# Patient Record
Sex: FEMALE | Race: WHITE | Employment: FULL TIME | URBAN - METROPOLITAN AREA
[De-identification: names, ages, dates, MRNs, and addresses within clinical notes are randomized per-mention and may not be internally consistent; named-entity substitution may affect disease eponyms.]

---

## 2017-01-11 ENCOUNTER — HOSPITAL ENCOUNTER (OUTPATIENT)
Age: 62
Discharge: HOME | End: 2017-01-11
Attending: EMERGENCY MEDICINE
Payer: COMMERCIAL

## 2017-01-11 VITALS
OXYGEN SATURATION: 100 % | DIASTOLIC BLOOD PRESSURE: 89 MMHG | BODY MASS INDEX: 22.32 KG/M2 | WEIGHT: 126 LBS | RESPIRATION RATE: 16 BRPM | TEMPERATURE: 98 F | HEART RATE: 80 BPM | SYSTOLIC BLOOD PRESSURE: 153 MMHG | HEIGHT: 63 IN

## 2017-01-11 DIAGNOSIS — H10.30 ACUTE CONJUNCTIVITIS, UNSPECIFIED ACUTE CONJUNCTIVITIS TYPE, UNSPECIFIED LATERALITY: Primary | ICD-10-CM

## 2017-01-11 PROCEDURE — 99214 OFFICE O/P EST MOD 30 MIN: CPT

## 2017-01-11 PROCEDURE — 99213 OFFICE O/P EST LOW 20 MIN: CPT

## 2017-01-11 RX ORDER — POLYMYXIN B SULFATE AND TRIMETHOPRIM 1; 10000 MG/ML; [USP'U]/ML
1 SOLUTION OPHTHALMIC 4 TIMES DAILY
Qty: 10 ML | Refills: 0 | Status: SHIPPED | OUTPATIENT
Start: 2017-01-11 | End: 2017-01-16

## 2017-01-11 NOTE — ED PROVIDER NOTES
Patient Seen in: [facility] [place]    History   No chief complaint on file. Stated Complaint: pink eye/uri    HPI    70-year-old female presents for evaluation of bilateral eye redness, cough, sore throat.   Patient reports w Constitutional: She is oriented to person, place, and time. She appears well-developed and well-nourished. No distress. HENT:   Head: Normocephalic and atraumatic. Mouth/Throat: Oropharynx is clear and moist. No oropharyngeal exudate.    TMs normal bi Refills: 0

## 2017-03-02 ENCOUNTER — OFFICE VISIT (OUTPATIENT)
Dept: DERMATOLOGY CLINIC | Facility: CLINIC | Age: 62
End: 2017-03-02

## 2017-03-02 DIAGNOSIS — L57.0 ACTINIC KERATOSIS: Primary | ICD-10-CM

## 2017-03-02 DIAGNOSIS — L81.4 SOLAR LENTIGO: ICD-10-CM

## 2017-03-02 PROCEDURE — 99201 OFFICE/OUTPT VISIT,NEW,LEVL I: CPT | Performed by: DERMATOLOGY

## 2017-03-02 PROCEDURE — 17000 DESTRUCT PREMALG LESION: CPT | Performed by: DERMATOLOGY

## 2017-03-02 PROCEDURE — 99212 OFFICE O/P EST SF 10 MIN: CPT | Performed by: DERMATOLOGY

## 2017-03-02 RX ORDER — ERGOCALCIFEROL 1.25 MG/1
CAPSULE ORAL
COMMUNITY
Start: 2017-01-30

## 2017-03-02 RX ORDER — TRETINOIN 0.025 %
1 CREAM (GRAM) TOPICAL NIGHTLY
Qty: 30 G | Refills: 3 | Status: SHIPPED | OUTPATIENT
Start: 2017-03-02 | End: 2018-04-27

## 2017-03-02 RX ORDER — TRIAMCINOLONE ACETONIDE 5 MG/G
CREAM TOPICAL
COMMUNITY
Start: 2017-02-14 | End: 2018-04-27

## 2017-03-02 NOTE — PROGRESS NOTES
HPI:     Chief Complaint     Lesion        HPI     Lesion    Additional comments: New pt presenting with red lesions to L cheek. C/o changes with color and size over past month. Pt denies personal and family hx of skin cancer.         Last edited by Bird Norton No pertinent family history. PHYSICAL EXAM:   Patient is alert and oriented and appears their stated age. They are well-nourished. Exam is remarkable for the following-  1. The area in question is a 9 mm lighter brown macule.   Upper aspect of it with

## 2017-03-07 ENCOUNTER — TELEPHONE (OUTPATIENT)
Dept: DERMATOLOGY CLINIC | Facility: CLINIC | Age: 62
End: 2017-03-07

## 2017-03-07 NOTE — TELEPHONE ENCOUNTER
PA form signed by [facility] and faxed to [facility]. Fax confirmation received. Awaiting response.

## 2017-03-09 NOTE — TELEPHONE ENCOUNTER
Pt contacted, informed that PA for tretinoin was denied by insurance. Explained about the [employer] process, Pt agreed to [employer] from [employer].  Pt given [place] telephone number and informed to call the pharmacy is she does not receive a phone

## 2017-03-09 NOTE — TELEPHONE ENCOUNTER
Pt calling stating that Tazorac is not covered through [facility], cannot afford cost of med. Please advise.

## 2017-03-09 NOTE — TELEPHONE ENCOUNTER
Pt contacted. She states that cost of Tazorac from [employer] was over $150.00. Pt states that tretinoin at [facility] pharmacy was about $72.00 and she will  Rx at [facility]. Pt informed about defferin . 1% cream from OTC, but she is concerned about med not aga

## 2017-03-09 NOTE — TELEPHONE ENCOUNTER
[facility] pharmacy contacted, informed that tretinoin PA was denied

## 2017-03-09 NOTE — TELEPHONE ENCOUNTER
Ok- cost if not covered at [employer] would be $75- would likely last several months- if pt doesn't want to try that, then can use otc differin . 1% cr.  Not as strong, but may still bebeneficial.

## 2017-03-09 NOTE — TELEPHONE ENCOUNTER
Change to Tazorac- generic ok . 05% cr- send to [facility]-30 gm and 3 rf  let pt know this is a retinoid, similar to retin a- used for sun damage, as well as psoriasis amd acne

## 2017-10-07 ENCOUNTER — LAB ENCOUNTER (OUTPATIENT)
Dept: LAB | Facility: HOSPITAL | Age: 62
End: 2017-10-07
Attending: INTERNAL MEDICINE
Payer: COMMERCIAL

## 2017-10-07 DIAGNOSIS — E03.9 MYXEDEMA HEART DISEASE: ICD-10-CM

## 2017-10-07 DIAGNOSIS — I51.9 MYXEDEMA HEART DISEASE: ICD-10-CM

## 2017-10-07 DIAGNOSIS — E78.2 MIXED HYPERLIPIDEMIA: Primary | ICD-10-CM

## 2017-10-07 PROCEDURE — 80061 LIPID PANEL: CPT

## 2017-10-07 PROCEDURE — 36415 COLL VENOUS BLD VENIPUNCTURE: CPT

## 2017-10-07 PROCEDURE — 84443 ASSAY THYROID STIM HORMONE: CPT

## 2017-10-07 PROCEDURE — 84439 ASSAY OF FREE THYROXINE: CPT

## 2018-01-23 ENCOUNTER — OFFICE VISIT (OUTPATIENT)
Dept: INTERNAL MEDICINE CLINIC | Facility: HOSPITAL | Age: 63
End: 2018-01-23
Attending: EMERGENCY MEDICINE

## 2018-01-23 DIAGNOSIS — Z00.00 WELLNESS EXAMINATION: Primary | ICD-10-CM

## 2018-01-23 LAB
FLUAV + FLUBV RNA SPEC NAA+PROBE: NEGATIVE
FLUAV + FLUBV RNA SPEC NAA+PROBE: NEGATIVE
FLUAV + FLUBV RNA SPEC NAA+PROBE: POSITIVE

## 2018-01-23 PROCEDURE — 87631 RESP VIRUS 3-5 TARGETS: CPT

## 2018-04-27 ENCOUNTER — TELEPHONE (OUTPATIENT)
Dept: GASTROENTEROLOGY | Facility: CLINIC | Age: 63
End: 2018-04-27

## 2018-04-27 DIAGNOSIS — Z86.010 PERSONAL HISTORY OF COLONIC POLYPS: Primary | ICD-10-CM

## 2018-04-27 NOTE — TELEPHONE ENCOUNTER
Pt calling for a repeat cln states ;ast cln was about 5 years ago w dr Jackson Randall please call thank you

## 2018-04-27 NOTE — TELEPHONE ENCOUNTER
Last Procedure:  CLN 10/6/2012   Last Diagnosis: Diminutive sigmoid colon polyps.   Recalled for (years): 5 years per [employer]-due 10/6/17  Sedation used previously:  IV  Last Prep Used (if known):  miraLAX  Quality of prep (if known):  \"mostly excellent\"

## 2018-04-30 NOTE — TELEPHONE ENCOUNTER
Scheduled for:  Colonoscopy - [address]7738  Provider Name:  Dr. Robin Chaparro  Date:  6/20/18  Location:  [facility]  Sedation:  IV  Time:  9:45 am (pt is aware to arrive at 8:45 am)  Prep:  Colyte, Prep instructions were given to pt over the phone, pt verbalized understanding.   Filomena Trejo

## 2018-04-30 NOTE — TELEPHONE ENCOUNTER
The patient's chart has been reviewed. Okay to schedule pt for 5 year colonoscopy recall r/t history of colon polyps with Dr. Pete Gant. Advise IV [place] sedation with split dose Colyte or equivalent (eRx) preparation.      May continue all medicat

## 2018-05-04 ENCOUNTER — HOSPITAL ENCOUNTER (OUTPATIENT)
Dept: CT IMAGING | Age: 63
Discharge: HOME | End: 2018-05-04
Attending: INTERNAL MEDICINE

## 2018-05-04 DIAGNOSIS — Z13.6 SCREENING FOR HEART DISEASE: ICD-10-CM

## 2018-05-04 NOTE — PROGRESS NOTES
Pt seen at [facility]:  PRELIMINARY SCORE= 0.0  BP=  Cholestec labs as follows: Patient states that she recently had cholesterol checked through her work.   TC=  HDL=  LDL=  TG=  GLUCOSE=  All results and risk factors discussed with patient; al

## 2018-06-20 ENCOUNTER — SURGERY (OUTPATIENT)
Age: 63
End: 2018-06-20

## 2018-06-20 ENCOUNTER — HOSPITAL ENCOUNTER (OUTPATIENT)
Facility: HOSPITAL | Age: 63
Setting detail: HOSPITAL OUTPATIENT SURGERY
Discharge: HOME | End: 2018-06-20
Attending: INTERNAL MEDICINE | Admitting: PHYSICAL MEDICINE & REHABILITATION
Payer: COMMERCIAL

## 2018-06-20 DIAGNOSIS — Z86.010 PERSONAL HISTORY OF COLONIC POLYPS: ICD-10-CM

## 2018-06-20 PROCEDURE — G0500 MOD SEDAT ENDO SERVICE >5YRS: HCPCS | Performed by: INTERNAL MEDICINE

## 2018-06-20 PROCEDURE — 45385 COLONOSCOPY W/LESION REMOVAL: CPT | Performed by: INTERNAL MEDICINE

## 2018-06-20 PROCEDURE — 0DBL8ZX EXCISION OF TRANSVERSE COLON, VIA NATURAL OR ARTIFICIAL OPENING ENDOSCOPIC, DIAGNOSTIC: ICD-10-PCS | Performed by: INTERNAL MEDICINE

## 2018-06-20 PROCEDURE — 0DBN8ZX EXCISION OF SIGMOID COLON, VIA NATURAL OR ARTIFICIAL OPENING ENDOSCOPIC, DIAGNOSTIC: ICD-10-PCS | Performed by: INTERNAL MEDICINE

## 2018-06-20 RX ORDER — DICYCLOMINE HCL 20 MG
20 TABLET ORAL 4 TIMES DAILY PRN
Qty: 30 TABLET | Refills: 1 | Status: SHIPPED | OUTPATIENT
Start: 2018-06-20

## 2018-06-20 RX ORDER — MIDAZOLAM HYDROCHLORIDE 1 MG/ML
INJECTION INTRAMUSCULAR; INTRAVENOUS
Status: DISCONTINUED | OUTPATIENT
Start: 2018-06-20 | End: 2018-06-20

## 2018-06-20 RX ORDER — DICYCLOMINE HCL 20 MG
20 TABLET ORAL
Status: ON HOLD | COMMUNITY
End: 2018-06-20

## 2018-06-20 RX ORDER — SODIUM CHLORIDE, SODIUM LACTATE, POTASSIUM CHLORIDE, CALCIUM CHLORIDE 600; 310; 30; 20 MG/100ML; MG/100ML; MG/100ML; MG/100ML
INJECTION, SOLUTION INTRAVENOUS CONTINUOUS
Status: DISCONTINUED | OUTPATIENT
Start: 2018-06-20 | End: 2018-06-20

## 2018-06-20 RX ORDER — MIDAZOLAM HYDROCHLORIDE 1 MG/ML
1 INJECTION INTRAMUSCULAR; INTRAVENOUS EVERY 5 MIN PRN
Status: DISCONTINUED | OUTPATIENT
Start: 2018-06-20 | End: 2018-06-20

## 2018-06-20 RX ORDER — SODIUM CHLORIDE 0.9 % (FLUSH) 0.9 %
10 SYRINGE (ML) INJECTION AS NEEDED
Status: DISCONTINUED | OUTPATIENT
Start: 2018-06-20 | End: 2018-06-20

## 2018-06-20 NOTE — OPERATIVE REPORT
[facility] Endoscopy Report      Date of Procedure:  06/20/18      Preoperative Diagnosis:  1. Personal history of adenomatous colon polyps  2.   Colorectal cancer screening      Postoperative Diagnosis:  Small colon polyps      Procedure: sites revealed no evidence of ongoing bleeding. There were no other colonic polyps, definite diverticula, mass lesions, vascular anomalies or signs of inflammation seen. Retroflexion in the right colon and rectum revealed no abnormalities.   The procedure

## 2018-06-20 NOTE — H&P
History & Physical Examination    Patient Name: Nya Ogden  MRN: [id number]  [facility]: [id number]  YOB: 1955    Diagnosis: Personal history of adenomatous colon polyps, colorectal cancer screening        Prescriptions Prior to Admission:

## 2018-06-21 VITALS
HEIGHT: 63 IN | SYSTOLIC BLOOD PRESSURE: 111 MMHG | DIASTOLIC BLOOD PRESSURE: 72 MMHG | OXYGEN SATURATION: 99 % | HEART RATE: 72 BPM | BODY MASS INDEX: 22.15 KG/M2 | RESPIRATION RATE: 19 BRPM | WEIGHT: 125 LBS

## 2018-06-22 ENCOUNTER — TELEPHONE (OUTPATIENT)
Dept: GASTROENTEROLOGY | Facility: CLINIC | Age: 63
End: 2018-06-22

## 2018-06-22 NOTE — TELEPHONE ENCOUNTER
----- Message from Papo Malone MD sent at 6/22/2018 12:29 PM CDT -----  I spoke to Homar Garcia. She is feeling well. Her polyps were hyperplastic. We discussed that she had a subcentimeter adenoma removed from the colon in 2007.   Subsequent colonosco

## 2018-09-29 ENCOUNTER — LAB ENCOUNTER (OUTPATIENT)
Dept: LAB | Facility: HOSPITAL | Age: 63
End: 2018-09-29
Attending: INTERNAL MEDICINE
Payer: COMMERCIAL

## 2018-09-29 DIAGNOSIS — E78.2 MIXED HYPERLIPIDEMIA: Primary | ICD-10-CM

## 2018-09-29 DIAGNOSIS — D64.9 ANEMIA: ICD-10-CM

## 2018-09-29 DIAGNOSIS — E55.9 VITAMIN D DEFICIENCY: ICD-10-CM

## 2018-09-29 DIAGNOSIS — E03.9 HYPOTHYROIDISM: ICD-10-CM

## 2018-09-29 PROCEDURE — 80053 COMPREHEN METABOLIC PANEL: CPT

## 2018-09-29 PROCEDURE — 82306 VITAMIN D 25 HYDROXY: CPT

## 2018-09-29 PROCEDURE — 84439 ASSAY OF FREE THYROXINE: CPT

## 2018-09-29 PROCEDURE — 36415 COLL VENOUS BLD VENIPUNCTURE: CPT

## 2018-09-29 PROCEDURE — 80061 LIPID PANEL: CPT

## 2018-09-29 PROCEDURE — 85025 COMPLETE CBC W/AUTO DIFF WBC: CPT

## 2018-09-29 PROCEDURE — 84443 ASSAY THYROID STIM HORMONE: CPT

## 2019-10-26 ENCOUNTER — LAB ENCOUNTER (OUTPATIENT)
Dept: LAB | Facility: HOSPITAL | Age: 64
End: 2019-10-26
Attending: INTERNAL MEDICINE
Payer: COMMERCIAL

## 2019-10-26 DIAGNOSIS — E55.9 VITAMIN D DEFICIENCY: ICD-10-CM

## 2019-10-26 DIAGNOSIS — D50.8 OTHER IRON DEFICIENCY ANEMIA: ICD-10-CM

## 2019-10-26 DIAGNOSIS — E78.2 MIXED HYPERLIPIDEMIA: Primary | ICD-10-CM

## 2019-10-26 DIAGNOSIS — E03.9 HYPOTHYROIDISM, ADULT: ICD-10-CM

## 2019-10-26 PROCEDURE — 80053 COMPREHEN METABOLIC PANEL: CPT

## 2019-10-26 PROCEDURE — 36415 COLL VENOUS BLD VENIPUNCTURE: CPT

## 2019-10-26 PROCEDURE — 80061 LIPID PANEL: CPT

## 2019-10-26 PROCEDURE — 84443 ASSAY THYROID STIM HORMONE: CPT

## 2019-10-26 PROCEDURE — 85025 COMPLETE CBC W/AUTO DIFF WBC: CPT

## 2019-10-26 PROCEDURE — 82306 VITAMIN D 25 HYDROXY: CPT

## 2019-10-26 PROCEDURE — 84439 ASSAY OF FREE THYROXINE: CPT

## 2019-11-13 ENCOUNTER — HOSPITAL ENCOUNTER (EMERGENCY)
Facility: HOSPITAL | Age: 64
Discharge: HOME | End: 2019-11-13
Attending: EMERGENCY MEDICINE
Payer: COMMERCIAL

## 2019-11-13 ENCOUNTER — TELEPHONE (OUTPATIENT)
Dept: ORTHOPEDICS CLINIC | Facility: CLINIC | Age: 64
End: 2019-11-13

## 2019-11-13 ENCOUNTER — APPOINTMENT (OUTPATIENT)
Dept: GENERAL RADIOLOGY | Facility: HOSPITAL | Age: 64
End: 2019-11-13
Attending: EMERGENCY MEDICINE
Payer: COMMERCIAL

## 2019-11-13 VITALS
HEART RATE: 61 BPM | RESPIRATION RATE: 16 BRPM | DIASTOLIC BLOOD PRESSURE: 74 MMHG | TEMPERATURE: 98 F | OXYGEN SATURATION: 97 % | SYSTOLIC BLOOD PRESSURE: 161 MMHG

## 2019-11-13 DIAGNOSIS — S62.101A CLOSED FRACTURE OF RIGHT WRIST, INITIAL ENCOUNTER: Primary | ICD-10-CM

## 2019-11-13 PROCEDURE — 99284 EMERGENCY DEPT VISIT MOD MDM: CPT

## 2019-11-13 PROCEDURE — 73110 X-RAY EXAM OF WRIST: CPT | Performed by: EMERGENCY MEDICINE

## 2019-11-13 PROCEDURE — 72100 X-RAY EXAM L-S SPINE 2/3 VWS: CPT | Performed by: EMERGENCY MEDICINE

## 2019-11-13 NOTE — ED PROVIDER NOTES
Patient Seen in: [facility] Emergency Department      History   Patient presents with:  Fall (musculoskeletal, neurologic)    Stated Complaint: fall, wrist/back pain    HPI    Patient is a 30-year-old female she was playing tennis.   She was backpe ear normal.   Nose: Nose normal.   Mouth/Throat: Oropharynx is clear and moist.   Eyes: Conjunctivae and EOM are normal. Pupils are equal, round, and reactive to light. Neck: Neck supple.    Back exam there is some minimal tenderness in lower lumbar and s A short arm OCL splint was applied to the right wrist.  After application of the splint I returned and re-examined the patient.   The splint was adequately immobilizing the joint and distal to the splint the patient's circulation and sensation was intac

## 2019-11-13 NOTE — ED INITIAL ASSESSMENT (HPI)
Connor Calloway is here c/o right wrist pain and lower back pain s/p trip and fall backwards while playing tennis. No obvious deformity. Distal CMS intact to MARY. Denies head injury.

## 2019-11-13 NOTE — TELEPHONE ENCOUNTER
JOSSE Gutierrez, this pt was seen in [address] [place] [address] ED today for right wrist fx. A short arm OCL splint was applied to the right wrist, per ED notes. Xrays taken. Should pt be seen today or tomorrow or can this wait until next week on Monday or Tuesday?

## 2019-11-13 NOTE — TELEPHONE ENCOUNTER
It is not an emergency if it is a non-displaced fracture of the wrist. Patient should be seen in subacute setting, namely within 3-5 days of her ED visit. If we have an opening tomorrow you can add on. Otherwise can be seen in the next few days. Thanks.

## 2019-11-14 NOTE — TELEPHONE ENCOUNTER
Spoke to pt and and she states she has an appt with Chante for today at 10:00AM at [facility].

## 2019-12-19 ENCOUNTER — ORDER TRANSCRIPTION (OUTPATIENT)
Dept: PHYSICAL THERAPY | Facility: HOSPITAL | Age: 64
End: 2019-12-19

## 2019-12-19 ENCOUNTER — OFFICE VISIT (OUTPATIENT)
Dept: OCCUPATIONAL MEDICINE | Facility: HOSPITAL | Age: 64
End: 2019-12-19
Attending: ORTHOPAEDIC SURGERY
Payer: COMMERCIAL

## 2019-12-19 DIAGNOSIS — S62.101A RIGHT WRIST FRACTURE: Primary | ICD-10-CM

## 2019-12-19 PROCEDURE — 97166 OT EVAL MOD COMPLEX 45 MIN: CPT | Performed by: OCCUPATIONAL THERAPIST

## 2019-12-19 PROCEDURE — 97110 THERAPEUTIC EXERCISES: CPT | Performed by: OCCUPATIONAL THERAPIST

## 2019-12-19 NOTE — PROGRESS NOTES
OCCUPATIONAL THERAPY UPPER EXTREMITY EVALUATION:   Referring Physician: Dr. Medardo Rodriguez  Date of onset: 11/13/19  Diagnosis: Right distal radius fracture Date of Service: 12/19/19       PATIENT SUMMARY:   Radha Hi is a 71year old y/o female who pre skills,work and leisure skills. She could benefit from continued skilled OT to address the subcomponents of ROM,  strength and motor control to allow her the ability to regain above- noted skills. In agreement with FOTO score and clinical rationale this ev Modalities                    Moist heat  5 min                                       Charges: OT Eval x1, TE     Total Timed Treatment: 15 min     Total Treatment Time: 45 min       PLAN OF CARE:

## 2019-12-20 ENCOUNTER — OFFICE VISIT (OUTPATIENT)
Dept: OCCUPATIONAL MEDICINE | Facility: HOSPITAL | Age: 64
End: 2019-12-20
Attending: ORTHOPAEDIC SURGERY
Payer: COMMERCIAL

## 2019-12-20 PROCEDURE — 97140 MANUAL THERAPY 1/> REGIONS: CPT | Performed by: OCCUPATIONAL THERAPIST

## 2019-12-20 PROCEDURE — 97110 THERAPEUTIC EXERCISES: CPT | Performed by: OCCUPATIONAL THERAPIST

## 2019-12-20 NOTE — PROGRESS NOTES
Dx:  Right distal radius         Authorized # of Visits:  10         Next MD visit: none scheduled  Fall Risk: standard         Precautions: n/a           Medication Changes since last visit?: No  Subjective: Patient reports wrist is feeling OK after las assessed for  and pinch strength when appropriate. Plan: Continue to focus on pain management and promoting AROM for functional grasping.     Charges: MT, TE2       Total Timed Treatment: 40 min  Total Treatment Time: 45 min

## 2019-12-23 ENCOUNTER — OFFICE VISIT (OUTPATIENT)
Dept: OCCUPATIONAL MEDICINE | Facility: HOSPITAL | Age: 64
End: 2019-12-23
Attending: ORTHOPAEDIC SURGERY
Payer: COMMERCIAL

## 2019-12-23 PROCEDURE — 97140 MANUAL THERAPY 1/> REGIONS: CPT | Performed by: OCCUPATIONAL THERAPIST

## 2019-12-23 PROCEDURE — 97110 THERAPEUTIC EXERCISES: CPT | Performed by: OCCUPATIONAL THERAPIST

## 2019-12-23 NOTE — PROGRESS NOTES
Dx:  Right distal radius         Authorized # of Visits:  10         Next MD visit: none scheduled  Fall Risk: standard         Precautions: n/a           Medication Changes since last visit?: No  Subjective:\"I was using my hand a lot yesterday, I had t play.Adbvised patient to continue with AROM exercises and will advance strengthening when appropriate. Goals:     Pt complaints of pain in right wrist will decrease at worst to 1/10.   Pt will be independent and compliant with comprehensive HEP to main

## 2019-12-26 ENCOUNTER — APPOINTMENT (OUTPATIENT)
Dept: OCCUPATIONAL MEDICINE | Facility: HOSPITAL | Age: 64
End: 2019-12-26
Attending: ORTHOPAEDIC SURGERY
Payer: COMMERCIAL

## 2019-12-26 PROCEDURE — 97140 MANUAL THERAPY 1/> REGIONS: CPT | Performed by: OCCUPATIONAL THERAPIST

## 2019-12-26 PROCEDURE — 97110 THERAPEUTIC EXERCISES: CPT | Performed by: OCCUPATIONAL THERAPIST

## 2019-12-26 NOTE — PROGRESS NOTES
Dx:  Right distal radius         Authorized # of Visits:  10         Next MD visit: none scheduled  Fall Risk: standard         Precautions: n/a           Medication Changes since last visit?: No  Subjective: Patient reports less pain in wrist with perfo Modalities                    Moist heat  5 min  5 min  5 min  5 min                                     Assessment: Patient has improved digit CAO by the following: D1 by 17 degrees, D2 by 10 degrees, D4 by 13 degrees, D

## 2020-01-07 ENCOUNTER — OFFICE VISIT (OUTPATIENT)
Dept: OCCUPATIONAL MEDICINE | Facility: HOSPITAL | Age: 65
End: 2020-01-07
Attending: ORTHOPAEDIC SURGERY
Payer: COMMERCIAL

## 2020-01-07 PROCEDURE — 97110 THERAPEUTIC EXERCISES: CPT | Performed by: OCCUPATIONAL THERAPIST

## 2020-01-07 PROCEDURE — 97140 MANUAL THERAPY 1/> REGIONS: CPT | Performed by: OCCUPATIONAL THERAPIST

## 2020-01-07 NOTE — PROGRESS NOTES
Dx:  Right distal radius         Authorized # of Visits:  10         Next MD visit: none scheduled  Fall Risk: standard         Precautions: n/a           Medication Changes since last visit?: No  Subjective: \"I'm not sure how much I should be wearing m Therapeutic Activity                                       Neuromuscular Re-education                                                             Modalities                    Moist heat  5 min  5 min  5 min  5 min  5 min

## 2020-01-10 ENCOUNTER — OFFICE VISIT (OUTPATIENT)
Dept: OCCUPATIONAL MEDICINE | Facility: HOSPITAL | Age: 65
End: 2020-01-10
Attending: ORTHOPAEDIC SURGERY
Payer: COMMERCIAL

## 2020-01-10 PROCEDURE — 97110 THERAPEUTIC EXERCISES: CPT | Performed by: OCCUPATIONAL THERAPIST

## 2020-01-10 PROCEDURE — 97140 MANUAL THERAPY 1/> REGIONS: CPT | Performed by: OCCUPATIONAL THERAPIST

## 2020-01-10 NOTE — PROGRESS NOTES
Dx:  Right distal radius         Authorized # of Visits:  10         Next MD visit: none scheduled  Fall Risk: standard         Precautions: n/a           Medication Changes since last visit?: No  Subjective: Patient reports she has been compliant with w flex/ext stretch 10 sec x 10, 25   Red web wrist flex/ext stretch 10 sec x 10, 25         wrist AROM and tendon glides  x15, 4x/day        HEP wrist PRE with 2# wt x 10, x4/day  Stretches- table top wrist flexion stretch 10 sec x 3       Therapeu

## 2020-01-13 ENCOUNTER — OFFICE VISIT (OUTPATIENT)
Dept: OCCUPATIONAL MEDICINE | Facility: HOSPITAL | Age: 65
End: 2020-01-13
Attending: ORTHOPAEDIC SURGERY
Payer: COMMERCIAL

## 2020-01-13 PROCEDURE — 97110 THERAPEUTIC EXERCISES: CPT | Performed by: OCCUPATIONAL THERAPIST

## 2020-01-13 PROCEDURE — 97140 MANUAL THERAPY 1/> REGIONS: CPT | Performed by: OCCUPATIONAL THERAPIST

## 2020-01-13 NOTE — PROGRESS NOTES
Dx:  Right distal radius         Authorized # of Visits:  10         Next MD visit: none scheduled  Fall Risk: standard         Precautions: n/a           Medication Changes since last visit?: No  Subjective: Patient continues to experience some pain in Pronation/supination with 1 wt x 20  Pronation/supination with 2 wt x 20      Red putty, FDS/FDP, , twist, two point pinch for pegs    10 min  10 min  green putty 10 min  green putty 10 min  green putty 10 min, also extension rings  green putty 10 min, AROM for functional grasping.     Charges: MT, TE2       Total Timed Treatment: 40 min  Total Treatment Time: 45 min

## 2020-01-16 ENCOUNTER — OFFICE VISIT (OUTPATIENT)
Dept: OCCUPATIONAL MEDICINE | Facility: HOSPITAL | Age: 65
End: 2020-01-16
Attending: ORTHOPAEDIC SURGERY
Payer: COMMERCIAL

## 2020-01-16 PROCEDURE — 97110 THERAPEUTIC EXERCISES: CPT | Performed by: OCCUPATIONAL THERAPIST

## 2020-01-16 PROCEDURE — 97140 MANUAL THERAPY 1/> REGIONS: CPT | Performed by: OCCUPATIONAL THERAPIST

## 2020-01-16 NOTE — PROGRESS NOTES
Dx:  Right distal radius         Authorized # of Visits:  10         Next MD visit: none scheduled  Fall Risk: standard         Precautions: n/a           Medication Changes since last visit?: No  Subjective:  \"The pain has been a lot less, I was able to x21  gyrostick DTM for 2 min    Pronation/supination with 1 wt x 20  Pronation/supination with 1 wt x 20  Pronation/supination with 2 wt x 20  Pronation/supination with 2 wt x 20    Red putty, FDS/FDP, , twist, two point pinch for pegs    10 min  10 will demonstrate increase right  strength to at least 30 lbs for ease in carrying bags of groceries. Plan: Continue to focus on pain management and promoting AROM for functional grasping. Reevaluate next session.     Charges: MT, TE2       Total Time

## 2020-01-21 ENCOUNTER — OFFICE VISIT (OUTPATIENT)
Dept: OCCUPATIONAL MEDICINE | Facility: HOSPITAL | Age: 65
End: 2020-01-21
Attending: ORTHOPAEDIC SURGERY
Payer: COMMERCIAL

## 2020-01-21 PROCEDURE — 97110 THERAPEUTIC EXERCISES: CPT | Performed by: OCCUPATIONAL THERAPIST

## 2020-01-21 PROCEDURE — 97140 MANUAL THERAPY 1/> REGIONS: CPT | Performed by: OCCUPATIONAL THERAPIST

## 2020-01-21 NOTE — PROGRESS NOTES
Dx:  Right distal radius         Authorized # of Visits:  10         Next MD visit: none scheduled  Fall Risk: standard         Precautions: n/a           Medication Changes since last visit?: No  Subjective:  \"The pain has been a lot less, I was able to x7  x7  x7           DTM throwing darts     x21  gyrostick DTM for 2 min    Pronation/supination with 1 wt x 20  Pronation/supination with 1 wt x 20  Pronation/supination with 2 wt x 20  Pronation/supination with 2 wt x 20 Pronation/supination with 2 wt x Total Timed Treatment: 40 min  Total Treatment Time: 45 min               OT  Progress Summary      Pt has attended 9, cancelled 0, and no shown 0 visits in Occupational Therapy.      Subjective: Patient reports pain free wrist with daily activities with e to at least 60 degrees and extension to 55 degrees for ease in playing tennis. Carlos A Gutierrez .(Achieved)  Patient will demonstrate increase in digit D2- D5 CAO to at least 230 degrees for ease in opening jars. .. Carlos A Gutierrez (Achieved)  Patient will be assessed for  and pinch st

## 2020-01-23 ENCOUNTER — TELEPHONE (OUTPATIENT)
Dept: ORTHOPEDICS CLINIC | Facility: CLINIC | Age: 65
End: 2020-01-23

## 2020-01-23 NOTE — TELEPHONE ENCOUNTER
Pt. requesting to get a note for work stating that she is off of light duty. Please fax note to [facility] - fax # [phone]. Please call the pt to confirm that the fax was sent.

## 2020-01-24 ENCOUNTER — OFFICE VISIT (OUTPATIENT)
Dept: OCCUPATIONAL MEDICINE | Facility: HOSPITAL | Age: 65
End: 2020-01-24
Attending: ORTHOPAEDIC SURGERY
Payer: COMMERCIAL

## 2020-01-24 PROCEDURE — 97110 THERAPEUTIC EXERCISES: CPT | Performed by: OCCUPATIONAL THERAPIST

## 2020-01-24 PROCEDURE — 97140 MANUAL THERAPY 1/> REGIONS: CPT | Performed by: OCCUPATIONAL THERAPIST

## 2020-01-24 NOTE — PROGRESS NOTES
Dx:     Right distal radius         Authorized # of Visits:  15       Next MD visit: none scheduled  Fall Risk: standard         Precautions: n/a           Medication Changes since last visit?: No  Subjective: \"I went to see the doctor and my wrist is h in OT... .(Ongoing)  Patient will demonstrate increase in right wrist flexion to at least 60 degrees and extension to 55 degrees for ease in playing tennis. Mily Beckwith .(Achieved)  Patient will demonstrate increase in digit D2- D5 CAO to at least 230 degrees for ease

## 2020-01-27 ENCOUNTER — OFFICE VISIT (OUTPATIENT)
Dept: OCCUPATIONAL MEDICINE | Facility: HOSPITAL | Age: 65
End: 2020-01-27
Attending: ORTHOPAEDIC SURGERY
Payer: COMMERCIAL

## 2020-01-27 PROCEDURE — 97140 MANUAL THERAPY 1/> REGIONS: CPT | Performed by: OCCUPATIONAL THERAPIST

## 2020-01-27 PROCEDURE — 97110 THERAPEUTIC EXERCISES: CPT | Performed by: OCCUPATIONAL THERAPIST

## 2020-01-27 NOTE — PROGRESS NOTES
Dx:     Right distal radius         Authorized # of Visits:  15       Next MD visit: none scheduled  Fall Risk: standard         Precautions: n/a           Medication Changes since last visit?: No  Subjective: \"I went back to work last week and noticed toward)  Pt will be independent and compliant with comprehensive HEP to maintain progress achieved in OT... .(Ongoing)  Patient will demonstrate increase in right wrist flexion to at least 60 degrees and extension to 55 degrees for ease in playing tennis. Oneal Claude Oneal Claude

## 2020-01-28 ENCOUNTER — APPOINTMENT (OUTPATIENT)
Dept: OCCUPATIONAL MEDICINE | Facility: HOSPITAL | Age: 65
End: 2020-01-28
Attending: ORTHOPAEDIC SURGERY
Payer: COMMERCIAL

## 2020-01-30 ENCOUNTER — OFFICE VISIT (OUTPATIENT)
Dept: OCCUPATIONAL MEDICINE | Facility: HOSPITAL | Age: 65
End: 2020-01-30
Attending: ORTHOPAEDIC SURGERY
Payer: COMMERCIAL

## 2020-01-30 PROCEDURE — 97110 THERAPEUTIC EXERCISES: CPT | Performed by: OCCUPATIONAL THERAPIST

## 2020-01-30 PROCEDURE — 97140 MANUAL THERAPY 1/> REGIONS: CPT | Performed by: OCCUPATIONAL THERAPIST

## 2020-01-30 NOTE — PROGRESS NOTES
Dx:     Right distal radius         Authorized # of Visits:  15       Next MD visit: none scheduled  Fall Risk: standard         Precautions: n/a           Medication Changes since last visit?: No  Subjective: \" I was having difficulty with putting on a Modalities                    moist heat  5 min  5 min  5 min                                     Assessment: Patient continues to report weakness in gripping and pulling,Patient remains appropriately challenged with resistance in his program

## 2020-02-04 ENCOUNTER — OFFICE VISIT (OUTPATIENT)
Dept: OCCUPATIONAL MEDICINE | Facility: HOSPITAL | Age: 65
End: 2020-02-04
Attending: ORTHOPAEDIC SURGERY
Payer: COMMERCIAL

## 2020-02-04 PROCEDURE — 97110 THERAPEUTIC EXERCISES: CPT | Performed by: OCCUPATIONAL THERAPIST

## 2020-02-04 PROCEDURE — 97140 MANUAL THERAPY 1/> REGIONS: CPT | Performed by: OCCUPATIONAL THERAPIST

## 2020-02-04 NOTE — PROGRESS NOTES
Dx:     Right distal radius         Authorized # of Visits:  15       Next MD visit: none scheduled  Fall Risk: standard         Precautions: n/a           Medication Changes since last visit?: No  Subjective: \"I tried to hit the ball with tennis racket wt , 5 sec x 10  gripping exercise,  and hold 4# wt , 5 sec x 10  gripping exercise,  and hold 4# wt , 5 sec x 10           Therapeutic Activity                                       Neuromuscular Re-education

## 2020-02-06 ENCOUNTER — APPOINTMENT (OUTPATIENT)
Dept: OCCUPATIONAL MEDICINE | Facility: HOSPITAL | Age: 65
End: 2020-02-06
Attending: ORTHOPAEDIC SURGERY
Payer: COMMERCIAL

## 2020-02-06 PROCEDURE — 97110 THERAPEUTIC EXERCISES: CPT | Performed by: OCCUPATIONAL THERAPIST

## 2020-02-06 NOTE — PROGRESS NOTES
Dx:     Right distal radius         Authorized # of Visits:  15       Next MD visit: none scheduled  Fall Risk: standard         Precautions: n/a           Medication Changes since last visit?: No  Subjective: \"My grandson jumped on me and stretched my at 90 degrees and extended   body blade 2  min each with elbow at 90 degrees and extended   Review conditioning for return to tennis              gripping exercise,  and hold 4# wt , 5 sec x 10  gripping exercise,  and hold 4# wt , 5 sec x 10   49 lbs  Right key pinch 13 lbs   Left key pinch 15 lbs  Right three point pinch 15 lbs    Left three point pinch 16 lbs      Goals:   Pt complaints of pain in right wrist will decrease at worst to 1/10. Dante Carr .(Achieved)  Pt will be independent and compliant wit

## 2020-04-20 ENCOUNTER — LAB ENCOUNTER (OUTPATIENT)
Dept: LAB | Age: 65
End: 2020-04-20
Attending: INTERNAL MEDICINE
Payer: COMMERCIAL

## 2020-04-20 DIAGNOSIS — E03.9 HYPOTHYROIDISM: Primary | ICD-10-CM

## 2020-04-20 PROCEDURE — 84439 ASSAY OF FREE THYROXINE: CPT

## 2020-04-20 PROCEDURE — 84443 ASSAY THYROID STIM HORMONE: CPT

## 2020-04-20 PROCEDURE — 36415 COLL VENOUS BLD VENIPUNCTURE: CPT

## 2020-06-10 ENCOUNTER — PATIENT MESSAGE (OUTPATIENT)
Dept: GASTROENTEROLOGY | Facility: CLINIC | Age: 65
End: 2020-06-10

## 2020-06-10 ENCOUNTER — TELEPHONE (OUTPATIENT)
Dept: GASTROENTEROLOGY | Facility: CLINIC | Age: 65
End: 2020-06-10

## 2020-06-10 NOTE — TELEPHONE ENCOUNTER
Forwarded to Dr George Hogue. Thanks.          Roby Vasquez, RN routed conversation to Carley Murdock MD 6 hours ago (11:19 AM)      Roby Vasquez RN 6 hours ago (11:19 AM)         Patient states has been experiencing loose stool (once daily) x 1 w

## 2020-06-10 NOTE — TELEPHONE ENCOUNTER
Please see my chart message sent to the patient: [facility] Neena Dubin,    I do not think that the change is serious. Any other medication additions or changes other than the metoprolol such as antibiotics in the past several weeks? Any dietary changes?     I w

## 2020-06-10 NOTE — TELEPHONE ENCOUNTER
Patient states has been experiencing loose stool (once daily) x 1 week. Denies abdominal pain,nausea,vomiting,blood in stool or fever. Patient has hx of IBS with constipation. Not taking anything OTC for the loose stool. Patient asking if she should be concerned. Please advise. Thank you.

## 2020-06-29 DIAGNOSIS — Z78.9 PARTICIPANT IN HEALTH AND WELLNESS PLAN: Primary | ICD-10-CM

## 2020-07-02 ENCOUNTER — NURSE ONLY (OUTPATIENT)
Dept: LAB | Age: 65
End: 2020-07-02
Attending: PREVENTIVE MEDICINE

## 2020-07-02 DIAGNOSIS — Z78.9 PARTICIPANT IN HEALTH AND WELLNESS PLAN: ICD-10-CM

## 2020-07-02 PROCEDURE — 86769 SARS-COV-2 COVID-19 ANTIBODY: CPT

## 2020-07-03 LAB — SARS-COV-2 IGG SERPLBLD QL IA.RAPID: NEGATIVE

## 2020-12-09 ENCOUNTER — TELEPHONE (OUTPATIENT)
Dept: INTERNAL MEDICINE CLINIC | Facility: HOSPITAL | Age: 65
End: 2020-12-09

## 2020-12-09 ENCOUNTER — NURSE ONLY (OUTPATIENT)
Dept: LAB | Facility: HOSPITAL | Age: 65
End: 2020-12-09
Attending: PREVENTIVE MEDICINE

## 2020-12-09 DIAGNOSIS — Z20.822 SUSPECTED COVID-19 VIRUS INFECTION: ICD-10-CM

## 2020-12-09 DIAGNOSIS — Z20.822 SUSPECTED COVID-19 VIRUS INFECTION: Primary | ICD-10-CM

## 2020-12-09 PROCEDURE — 87426 SARSCOV CORONAVIRUS AG IA: CPT

## 2020-12-09 NOTE — TELEPHONE ENCOUNTER
Department:                             [] [facility]  []PARRISH   [x] [address] [facility]    Dept Manager/Supervisor/team or clinical lead: Shante Gates    Position:  [] MD     [x] RN     [] Respiratory Therapist     [] PCT     [] Other       SYMPTOMS:  [] asymptomatic    • Fev If yes, with whom? Does not recall who she ate luch with on Monday: states they are spaced at least 6 feet apart. Do you have any family members sick at home?      [] Yes    [x] No   If yes, explain:     NOTES: (narrative documentation)          PLAN:

## 2020-12-09 NOTE — TELEPHONE ENCOUNTER
----- Message from Amina Sharif MD sent at 12/9/2020  1:31 PM CST -----  Covid - not detected - results to covid center for resulting and employee follow up.

## 2020-12-09 NOTE — TELEPHONE ENCOUNTER
Results and RTW guidelines:    COVID RESULT GIVEN:      Test type:    [x] Rapid         []       [x] NEGATIVE     Ordered  retest?  [x]Yes   [] No (skip to RTW)       Date ordered: 12/9           Dated to be taken:  12/11    If Yes, PLACE ORD

## 2020-12-11 ENCOUNTER — LAB ENCOUNTER (OUTPATIENT)
Dept: LAB | Facility: HOSPITAL | Age: 65
End: 2020-12-11
Attending: NURSE PRACTITIONER

## 2020-12-11 DIAGNOSIS — Z20.822 SUSPECTED COVID-19 VIRUS INFECTION: ICD-10-CM

## 2020-12-12 NOTE — TELEPHONE ENCOUNTER
Results and RTW guidelines:    COVID RESULT GIVEN:      Test type:    [] Rapid         [x]       [x] NEGATIVE     Ordered  retest?  []Yes   [x] No (skip to RTW)         [] Positive   - Monitor sx and temperature    - Employee should quarantin

## 2020-12-23 ENCOUNTER — IMMUNIZATION (OUTPATIENT)
Dept: LAB | Facility: HOSPITAL | Age: 65
End: 2020-12-23
Attending: PREVENTIVE MEDICINE

## 2020-12-23 DIAGNOSIS — Z23 NEED FOR VACCINATION: ICD-10-CM

## 2020-12-23 PROCEDURE — 0001A SARSCOV2 VAC 30MCG/0.3ML IM: CPT

## 2021-01-13 ENCOUNTER — IMMUNIZATION (OUTPATIENT)
Dept: LAB | Facility: HOSPITAL | Age: 66
End: 2021-01-13
Attending: PREVENTIVE MEDICINE

## 2021-01-13 DIAGNOSIS — Z23 NEED FOR VACCINATION: Primary | ICD-10-CM

## 2021-01-13 PROCEDURE — 0002A SARSCOV2 VAC 30MCG/0.3ML IM: CPT

## 2021-04-13 ENCOUNTER — ORDER TRANSCRIPTION (OUTPATIENT)
Dept: PHYSICAL THERAPY | Age: 66
End: 2021-04-13

## 2021-04-13 DIAGNOSIS — M75.41 IMPINGEMENT SYNDROME OF RIGHT SHOULDER: Primary | ICD-10-CM

## 2021-04-15 ENCOUNTER — OFFICE VISIT (OUTPATIENT)
Dept: PHYSICAL THERAPY | Facility: HOSPITAL | Age: 66
End: 2021-04-15
Attending: ORTHOPAEDIC SURGERY
Payer: COMMERCIAL

## 2021-04-15 DIAGNOSIS — M75.41 IMPINGEMENT SYNDROME OF RIGHT SHOULDER: ICD-10-CM

## 2021-04-15 PROCEDURE — 97110 THERAPEUTIC EXERCISES: CPT

## 2021-04-15 PROCEDURE — 97161 PT EVAL LOW COMPLEX 20 MIN: CPT

## 2021-04-15 NOTE — PROGRESS NOTES
UPPER EXTREMITY EVALUATION:   Referring Physician: Dr. Gab Woodward  Diagnosis:   Impingement syndrome of left shoulder   Date of Onset: July, 2020    [place] [employer] [place]  Precautions: none significant:  Initial FOTO: 70.1/100  Visit: #8: FOTO:  D reaching of Left shoulder. Current functional limitations include : soreness  with grooming, reaching ( before injection), putting restrictive clothing on,   Fastening bra behind back.    Ernestina Davison describes prior level of function : was without limitati protracted scapulae and mild fwd head posture and left anterior humeral head alignment.    Cervical Screen:  Decreased right rotation, decreased lateral flexion bilatera  Palpation: elevated tenderness at left inferior angle, increased tension at LS and UT ABD to reach and fasten seatbelt   · Pt will increase shoulder AROM IR to 70 deg to be able to reach in back pocket, tuck in shirt, and turn steering wheel without pain  · Pt will improve shoulder strength throughout to 4+/5 to improve function with ADLs i

## 2021-04-20 ENCOUNTER — OFFICE VISIT (OUTPATIENT)
Dept: PHYSICAL THERAPY | Facility: HOSPITAL | Age: 66
End: 2021-04-20
Attending: ORTHOPAEDIC SURGERY
Payer: COMMERCIAL

## 2021-04-20 PROCEDURE — 97110 THERAPEUTIC EXERCISES: CPT

## 2021-04-20 PROCEDURE — 97140 MANUAL THERAPY 1/> REGIONS: CPT

## 2021-04-20 NOTE — PROGRESS NOTES
Dx: Impingement syndrome of left shoulder   Date of Onset: July, 2020   Insurance (Authorized # of Visits):  [employer] [place]   (8-12 visits per plan)        Authorizing Physician: Dr. Li ref.  provider found    Next MD visit: none scheduled  Fall Risk: wilian with comprehensive HEP to maintain progress achieved in PT         Plan: Continued to left shoulder gentle mob, stretching and stabilization exercises to improved functional mobility.    Visit #2/8-12 per plan   Date: 4/20/21 Visit #3/812 per plan  Date:  V

## 2021-04-22 ENCOUNTER — OFFICE VISIT (OUTPATIENT)
Dept: PHYSICAL THERAPY | Facility: HOSPITAL | Age: 66
End: 2021-04-22
Attending: ORTHOPAEDIC SURGERY
Payer: COMMERCIAL

## 2021-04-22 PROCEDURE — 97110 THERAPEUTIC EXERCISES: CPT

## 2021-04-22 PROCEDURE — 97140 MANUAL THERAPY 1/> REGIONS: CPT

## 2021-04-22 NOTE — PROGRESS NOTES
Dx: Impingement syndrome of left shoulder   Date of Onset: July, 2020   Insurance (Authorized # of Visits):  [employer] [place]   (8-12 visits per plan)        Authorizing Physician: Dr. Li ref.  provider found    Next MD visit: none scheduled  Fall Risk: wilian work tasks by 12-16 wks with good tolerance  · Pt will be independent and compliant with comprehensive HEP to maintain progress achieved in PT         Plan: Continued to left shoulder gentle mob, stretching and stabilization exercises to improved functiona

## 2021-05-05 ENCOUNTER — OFFICE VISIT (OUTPATIENT)
Dept: PHYSICAL THERAPY | Facility: HOSPITAL | Age: 66
End: 2021-05-05
Attending: ORTHOPAEDIC SURGERY
Payer: COMMERCIAL

## 2021-05-05 PROCEDURE — 97110 THERAPEUTIC EXERCISES: CPT

## 2021-05-05 PROCEDURE — 97140 MANUAL THERAPY 1/> REGIONS: CPT

## 2021-05-05 NOTE — PROGRESS NOTES
Dx: Impingement syndrome of left shoulder   Date of Onset: July, 2020   Insurance (Authorized # of Visits):  [employer] [place]   (8-12 visits per plan)        Authorizing Physician: Dr. Li ref.  provider found    Next MD visit: none scheduled  Fall Risk: wilian pocket, tuck in shirt, and turn steering wheel without pain  PROGRESSING  · Pt will improve shoulder strength throughout to 4+/5 to improve function with ADLs including lifting groceries opening and closing doors   · Pt will demonstrate increased mid/low t abd, ER/IR Manual Therapy:   -Supine Left shoulder   gr I II post glides, inf glides  -PROM Flex, abd, ER/IR Manual therapy:  -STM to posterior cuff, pect major insertion  -scapular distraction mobilizations right S/L to left scap  -scap mobilizations all

## 2021-05-13 ENCOUNTER — TELEPHONE (OUTPATIENT)
Dept: PHYSICAL THERAPY | Facility: HOSPITAL | Age: 66
End: 2021-05-13

## 2021-05-17 ENCOUNTER — APPOINTMENT (OUTPATIENT)
Dept: PHYSICAL THERAPY | Facility: HOSPITAL | Age: 66
End: 2021-05-17
Attending: INTERNAL MEDICINE
Payer: COMMERCIAL

## 2021-05-23 ENCOUNTER — LAB ENCOUNTER (OUTPATIENT)
Dept: LAB | Facility: HOSPITAL | Age: 66
End: 2021-05-23
Attending: INTERNAL MEDICINE
Payer: COMMERCIAL

## 2021-05-23 DIAGNOSIS — I51.9 MYXEDEMA HEART DISEASE: ICD-10-CM

## 2021-05-23 DIAGNOSIS — D64.9 ANEMIA: ICD-10-CM

## 2021-05-23 DIAGNOSIS — E78.2 MIXED HYPERLIPIDEMIA: Primary | ICD-10-CM

## 2021-05-23 DIAGNOSIS — E03.9 MYXEDEMA HEART DISEASE: ICD-10-CM

## 2021-05-23 DIAGNOSIS — E55.9 VITAMIN D DEFICIENCY: ICD-10-CM

## 2021-05-23 PROCEDURE — 84439 ASSAY OF FREE THYROXINE: CPT

## 2021-05-23 PROCEDURE — 80053 COMPREHEN METABOLIC PANEL: CPT

## 2021-05-23 PROCEDURE — 85025 COMPLETE CBC W/AUTO DIFF WBC: CPT

## 2021-05-23 PROCEDURE — 36415 COLL VENOUS BLD VENIPUNCTURE: CPT

## 2021-05-23 PROCEDURE — 84443 ASSAY THYROID STIM HORMONE: CPT

## 2021-05-23 PROCEDURE — 82306 VITAMIN D 25 HYDROXY: CPT

## 2021-05-23 PROCEDURE — 80061 LIPID PANEL: CPT

## 2021-12-09 RX ORDER — DICYCLOMINE HCL 20 MG
20 TABLET ORAL 4 TIMES DAILY PRN
Qty: 30 TABLET | Refills: 1 | Status: CANCELLED | OUTPATIENT
Start: 2021-12-09

## 2021-12-13 NOTE — TELEPHONE ENCOUNTER
Medication last prescribed 06/20/2018 which is > 3 years. Needs office visit if medication is needed.

## 2022-04-19 ENCOUNTER — HOSPITAL ENCOUNTER (OUTPATIENT)
Age: 67
Discharge: HOME | End: 2022-04-19
Payer: MEDICARE

## 2022-04-19 VITALS
OXYGEN SATURATION: 98 % | HEART RATE: 89 BPM | WEIGHT: 126 LBS | SYSTOLIC BLOOD PRESSURE: 120 MMHG | RESPIRATION RATE: 18 BRPM | HEIGHT: 63 IN | DIASTOLIC BLOOD PRESSURE: 78 MMHG | BODY MASS INDEX: 22.32 KG/M2 | TEMPERATURE: 98 F

## 2022-04-19 DIAGNOSIS — R68.89 FLU-LIKE SYMPTOMS: Primary | ICD-10-CM

## 2022-04-19 LAB
POCT INFLUENZA A: NEGATIVE
POCT INFLUENZA B: NEGATIVE
S PYO AG THROAT QL: NEGATIVE

## 2022-04-19 PROCEDURE — 87502 INFLUENZA DNA AMP PROBE: CPT | Performed by: EMERGENCY MEDICINE

## 2022-04-19 PROCEDURE — 87880 STREP A ASSAY W/OPTIC: CPT | Performed by: EMERGENCY MEDICINE

## 2022-04-19 PROCEDURE — 99213 OFFICE O/P EST LOW 20 MIN: CPT | Performed by: EMERGENCY MEDICINE

## 2022-04-19 RX ORDER — METOCLOPRAMIDE 10 MG/1
10 TABLET ORAL 3 TIMES DAILY PRN
Qty: 10 TABLET | Refills: 0 | Status: SHIPPED | OUTPATIENT
Start: 2022-04-19

## 2022-04-19 RX ORDER — ONDANSETRON 4 MG/1
4 TABLET, ORALLY DISINTEGRATING ORAL EVERY 4 HOURS PRN
Qty: 10 TABLET | Refills: 0 | Status: SHIPPED | OUTPATIENT
Start: 2022-04-19 | End: 2022-04-26

## 2022-04-19 NOTE — ED INITIAL ASSESSMENT (HPI)
Patient presents to IC with c/o sore throat, headache, body aches/chills, nausea starting Sunday. Negative covid at home test taken today. Denies vomiting or diarrhea.

## 2022-04-21 ENCOUNTER — LAB ENCOUNTER (OUTPATIENT)
Dept: LAB | Facility: HOSPITAL | Age: 67
End: 2022-04-21
Attending: PREVENTIVE MEDICINE
Payer: MEDICARE

## 2022-04-21 ENCOUNTER — TELEPHONE (OUTPATIENT)
Dept: INTERNAL MEDICINE CLINIC | Facility: HOSPITAL | Age: 67
End: 2022-04-21

## 2022-04-21 ENCOUNTER — HOSPITAL ENCOUNTER (EMERGENCY)
Facility: HOSPITAL | Age: 67
Discharge: HOME | End: 2022-04-21
Attending: EMERGENCY MEDICINE
Payer: MEDICARE

## 2022-04-21 VITALS
DIASTOLIC BLOOD PRESSURE: 70 MMHG | SYSTOLIC BLOOD PRESSURE: 146 MMHG | RESPIRATION RATE: 18 BRPM | WEIGHT: 126 LBS | HEIGHT: 63 IN | BODY MASS INDEX: 22.32 KG/M2 | TEMPERATURE: 98 F | OXYGEN SATURATION: 97 % | HEART RATE: 88 BPM

## 2022-04-21 DIAGNOSIS — U07.1 COVID-19: Primary | ICD-10-CM

## 2022-04-21 DIAGNOSIS — Z20.822 SUSPECTED 2019 NOVEL CORONAVIRUS INFECTION: ICD-10-CM

## 2022-04-21 LAB — SARS-COV-2 RNA RESP QL NAA+PROBE: DETECTED

## 2022-04-21 PROCEDURE — 99284 EMERGENCY DEPT VISIT MOD MDM: CPT

## 2022-04-21 RX ORDER — BEBTELOVIMAB 87.5 MG/ML
175 INJECTION, SOLUTION INTRAVENOUS ONCE
Status: COMPLETED | OUTPATIENT
Start: 2022-04-21 | End: 2022-04-21

## 2022-04-21 NOTE — TELEPHONE ENCOUNTER
Results and RTW guidelines:    COVID RESULT:    [x] Viewed by employee in [address] RTW plan and instructions as indicated on triage call. Manager notified. Estimated RTW date: 04/23/22  [x] Discussed with employee   [] Unable to reach by phone. Sent via [employer] message      Test type:    [x] Rapid         [] Alinity         [] Outside test:           [x] Positive  Is employee unvaccinated? Yes []   No [x]          If yes:  Enter Covid Positive Medical exemption on Exemption Spreadsheet    Did you have close contact with someone on your unit while not wearing a mask? (e.g., during meal breaks):  Yes []   No []     If yes, who:     - Employee should quarantine at home for at least 5 days (day 1 is day after sx onset) , follow the CDC guidelines for cleaning and                              quarantining; see CDC.gov   -This employee may RTW on day 6 if asymptomatic or mildly symptomatic (with improving symptoms). Call Employee Health on day 5 if unable to return on day 6 after                      symptom onset.    -This employee needs to call [facility] on day 5 after symptom onset. The employee needs to be cleared by [facility]. - Monitor symptoms and temperature                 - Notify PCP of result                 - Seek emergent care with worsening symptoms   - If employee is still experiencing severe symptoms on day 5 must make a RTW appt with [facility], Employee will not be cleared if:    1. Has consistent cough, shortness of breath or fatigue that restricts your physical activities    2. Is still feeling \"unwell\"    3. Within 15 days of hospitalization for COVID    4. Within 20 days of intubation for COVID    5.  Still has a fever, vomiting or diarrhea   - Keep communication open with management about RTW and if symptoms worsen                - If outside testing completed, bring a copy of result to RTW appointment           Notes:     RTW PLAN:    [x]  If COVID positive results, off work minimum of 5 days from positive test or onset of symptoms (day 0)        On day 5, if asymptomatic or mildly symptomatic (with improving symptoms) may return to work day 6          On day 5, if symptomatic, call Employee Health for RTW screening        []  COVID positive result - call Employee Health on day 5 after symptom onset. The employee needs to be cleared by Employee Health to RTW. [] RTW immediately, continue to monitor for sx  [] RTW when sx improve; must be fever free for 24 hours w/o medications, Diarrhea/Vomiting free for 24 hours w/o medications  [] Alinity ordered; continue to monitor sx and call for new/worsening sx.   Discuss RTW guidelines with manager  [] May continue to work  [x] Follow up with PCP  [] Home until further instruction from hotline with Alinity results  INSTRUCTIONS PROVIDED:  [x]  Plan as noted above  []  Length of time to obtain results   [x]  Quarantine instructions  [x]  Masking protocol   []  S/S of worsening infection/condition and importance of prompt medical re-evaluation including when to seek emergency care  [] If symptoms develop, stay home and call hotline for rapid test order    Estimated RTW date:  04/23/22    [x] The employee voiced understanding of above plan/instructions  [x] Manager Notified

## 2022-04-22 NOTE — ED INITIAL ASSESSMENT (HPI)
Pt states she was sent in by Dr. Torsten Ling. Pt states she tested positive for covid and was told to come in for an infusion.

## 2022-05-12 ENCOUNTER — LAB ENCOUNTER (OUTPATIENT)
Dept: LAB | Facility: HOSPITAL | Age: 67
End: 2022-05-12
Attending: INTERNAL MEDICINE
Payer: MEDICARE

## 2022-05-12 DIAGNOSIS — R19.7 DIARRHEA, UNSPECIFIED TYPE: ICD-10-CM

## 2022-05-12 PROCEDURE — 87493 C DIFF AMPLIFIED PROBE: CPT

## 2022-05-12 PROCEDURE — 87045 FECES CULTURE AEROBIC BACT: CPT

## 2022-05-12 PROCEDURE — 87046 STOOL CULTR AEROBIC BACT EA: CPT

## 2022-05-12 PROCEDURE — 87427 SHIGA-LIKE TOXIN AG IA: CPT

## 2022-05-12 NOTE — TELEPHONE ENCOUNTER
I spoke to [facility]. As below. She received a short course of azithromycin. She has had no severe pain or bleeding. She may take dicyclomine and loperamide. I am recommending a stool culture and C. difficile toxin assay. Order placed.

## 2022-05-13 ENCOUNTER — LAB ENCOUNTER (OUTPATIENT)
Dept: LAB | Facility: HOSPITAL | Age: 67
End: 2022-05-13
Attending: INTERNAL MEDICINE
Payer: MEDICARE

## 2022-05-13 DIAGNOSIS — E78.5 HYPERLIPIDEMIA: Primary | ICD-10-CM

## 2022-05-13 DIAGNOSIS — E55.9 VITAMIN D DEFICIENCY: ICD-10-CM

## 2022-05-13 DIAGNOSIS — D64.9 ANEMIA: ICD-10-CM

## 2022-05-13 DIAGNOSIS — E03.9 HYPOTHYROIDISM, ADULT: ICD-10-CM

## 2022-05-13 LAB
ALBUMIN SERPL-MCNC: 3.8 G/DL (ref 3.4–5)
ALBUMIN/GLOB SERPL: 0.9 {RATIO} (ref 1–2)
ALP LIVER SERPL-CCNC: 76 U/L
ALT SERPL-CCNC: 23 U/L
ANION GAP SERPL CALC-SCNC: 5 MMOL/L (ref 0–18)
AST SERPL-CCNC: 20 U/L (ref 15–37)
BASOPHILS # BLD AUTO: 0.05 X10(3) UL (ref 0–0.2)
BASOPHILS NFR BLD AUTO: 0.9 %
BILIRUB SERPL-MCNC: 0.4 MG/DL (ref 0.1–2)
BUN BLD-MCNC: 20 MG/DL (ref 7–18)
BUN/CREAT SERPL: 20 (ref 10–20)
C DIFF TOX B STL QL: NEGATIVE
CALCIUM BLD-MCNC: 9.7 MG/DL (ref 8.5–10.1)
CHLORIDE SERPL-SCNC: 104 MMOL/L (ref 98–112)
CHOLEST SERPL-MCNC: 225 MG/DL (ref ?–200)
CO2 SERPL-SCNC: 30 MMOL/L (ref 21–32)
CREAT BLD-MCNC: 1 MG/DL
DEPRECATED RDW RBC AUTO: 42.4 FL (ref 35.1–46.3)
EOSINOPHIL # BLD AUTO: 0.34 X10(3) UL (ref 0–0.7)
EOSINOPHIL NFR BLD AUTO: 6.4 %
ERYTHROCYTE [DISTWIDTH] IN BLOOD BY AUTOMATED COUNT: 12 % (ref 11–15)
FASTING PATIENT LIPID ANSWER: YES
FASTING STATUS PATIENT QL REPORTED: YES
GLOBULIN PLAS-MCNC: 4.1 G/DL (ref 2.8–4.4)
GLUCOSE BLD-MCNC: 99 MG/DL (ref 70–99)
HCT VFR BLD AUTO: 39.5 %
HDLC SERPL-MCNC: 71 MG/DL (ref 40–59)
HGB BLD-MCNC: 12.7 G/DL
IMM GRANULOCYTES # BLD AUTO: 0.01 X10(3) UL (ref 0–1)
IMM GRANULOCYTES NFR BLD: 0.2 %
LDLC SERPL CALC-MCNC: 136 MG/DL (ref ?–100)
LYMPHOCYTES # BLD AUTO: 1.78 X10(3) UL (ref 1–4)
LYMPHOCYTES NFR BLD AUTO: 33.3 %
MCH RBC QN AUTO: 30.9 PG (ref 26–34)
MCHC RBC AUTO-ENTMCNC: 32.2 G/DL (ref 31–37)
MCV RBC AUTO: 96.1 FL
MONOCYTES # BLD AUTO: 0.61 X10(3) UL (ref 0.1–1)
MONOCYTES NFR BLD AUTO: 11.4 %
NEUTROPHILS # BLD AUTO: 2.56 X10 (3) UL (ref 1.5–7.7)
NEUTROPHILS # BLD AUTO: 2.56 X10(3) UL (ref 1.5–7.7)
NEUTROPHILS NFR BLD AUTO: 47.8 %
NONHDLC SERPL-MCNC: 154 MG/DL (ref ?–130)
OSMOLALITY SERPL CALC.SUM OF ELEC: 291 MOSM/KG (ref 275–295)
PLATELET # BLD AUTO: 221 10(3)UL (ref 150–450)
POTASSIUM SERPL-SCNC: 4.2 MMOL/L (ref 3.5–5.1)
PROT SERPL-MCNC: 7.9 G/DL (ref 6.4–8.2)
RBC # BLD AUTO: 4.11 X10(6)UL
SODIUM SERPL-SCNC: 139 MMOL/L (ref 136–145)
T4 FREE SERPL-MCNC: 1.2 NG/DL (ref 0.8–1.7)
TRIGL SERPL-MCNC: 103 MG/DL (ref 30–149)
TSI SER-ACNC: 2.52 MIU/ML (ref 0.36–3.74)
VIT D+METAB SERPL-MCNC: 103.7 NG/ML (ref 30–100)
VLDLC SERPL CALC-MCNC: 19 MG/DL (ref 0–30)
WBC # BLD AUTO: 5.4 X10(3) UL (ref 4–11)

## 2022-05-13 PROCEDURE — 85025 COMPLETE CBC W/AUTO DIFF WBC: CPT

## 2022-05-13 PROCEDURE — 84443 ASSAY THYROID STIM HORMONE: CPT

## 2022-05-13 PROCEDURE — 36415 COLL VENOUS BLD VENIPUNCTURE: CPT

## 2022-05-13 PROCEDURE — 80061 LIPID PANEL: CPT

## 2022-05-13 PROCEDURE — 84439 ASSAY OF FREE THYROXINE: CPT

## 2022-05-13 PROCEDURE — 80053 COMPREHEN METABOLIC PANEL: CPT

## 2022-05-13 PROCEDURE — 82306 VITAMIN D 25 HYDROXY: CPT

## 2022-11-01 ENCOUNTER — IMMUNIZATION (OUTPATIENT)
Dept: LAB | Facility: HOSPITAL | Age: 67
End: 2022-11-01
Attending: PREVENTIVE MEDICINE
Payer: MEDICARE

## 2022-11-01 DIAGNOSIS — Z23 NEED FOR VACCINATION: Primary | ICD-10-CM

## 2022-11-01 PROCEDURE — 90471 IMMUNIZATION ADMIN: CPT

## 2022-12-21 ENCOUNTER — TELEPHONE (OUTPATIENT)
Dept: INTERNAL MEDICINE CLINIC | Facility: HOSPITAL | Age: 67
End: 2022-12-21

## 2022-12-21 ENCOUNTER — LAB ENCOUNTER (OUTPATIENT)
Dept: LAB | Facility: HOSPITAL | Age: 67
End: 2022-12-21
Attending: PREVENTIVE MEDICINE

## 2022-12-21 DIAGNOSIS — Z20.822 SUSPECTED 2019 NOVEL CORONAVIRUS INFECTION: Primary | ICD-10-CM

## 2022-12-21 DIAGNOSIS — Z20.822 SUSPECTED 2019 NOVEL CORONAVIRUS INFECTION: ICD-10-CM

## 2022-12-21 LAB — SARS-COV-2 RNA RESP QL NAA+PROBE: NOT DETECTED

## 2022-12-22 NOTE — TELEPHONE ENCOUNTER
Results and RTW guidelines:    COVID RESULT:    [x] Viewed by employee in [facility]. RTW plan and instructions as indicated on triage call. Manager notified. Estimated RTW date:   [] Discussed with employee   [x] Unable to reach by phone. Sent via [employer] message      Test type:    [x] Rapid         [] Alinity         [] Outside test:       [x] NEGATIVE     Ordered Alinity retest?  []Yes   [x] No (skip to RTW)   Ordered Rapid retest?   []Yes   [x] No (skip to RTW)           Dated to be taken:      If Yes, PLACE ORDER NOW and instruct the following:  -Originally Symptomatic or Now Symptoms:   -RTW when sx improve- fever free for 24 hours w/o medications, Diarrhea/Vomiting for 24 hours w/o medications    -Originally  Asymptomatic  -Asymptomatic AND Vaccinated or Unvaccinated or Prior infection in past 90 days:   -May work and continue to monitor symptoms for the next 14 days.                                         -Rapid test day 2, rapid test day 5 (day 0 - exposure)    [] Positive     - Employee should quarantine at home for at least 5 days (day 1 is day after sx onset) , follow the CDC guidelines for cleaning and                              quarantining; see CDC.gov   -This employee may RTW on day 6 if asymptomatic or mildly symptomatic (with improving symptoms). Call Employee Health on day 5 if unable to return on day 6 after                      symptom onset.    -This employee needs to call Employee Health on day 5 after symptom onset. The employee needs to be cleared by Employee Health. - Monitor symptoms and temperature                 - Notify PCP of result                 - Seek emergent care with worsening symptoms   - If employee is still experiencing severe symptoms on day 5 must make a RTW appt with Employee Health, Employee will not be cleared if:    1. Has consistent cough, shortness of breath or fatigue that restricts your physical activities    2. Is still feeling \"unwell\"    3.  Within 15 days of hospitalization for COVID    4. Within 20 days of intubation for COVID    5. Still has a fever, vomiting or diarrhea   - Keep communication open with management about RTW and if symptoms worsen                - If outside testing completed, bring a copy of result to RTW appointment           Notes:     RTW PLAN:    []  If COVID positive results, off work minimum of 5 days from positive test or onset of symptoms (day 0)        On day 5, if asymptomatic or mildly symptomatic (with improving symptoms) may return to work day 6          On day 5, if symptomatic, call Employee Health for RTW screening        []  COVID positive result - call Employee Health on day 5 after symptom onset. The employee needs to be cleared by Employee Health to RTW. [x] RTW immediately, continue to monitor for sx  [] RTW when sx improve; must be fever free for 24 hours w/o medications, Diarrhea/Vomiting free for 24 hours w/o medications  [] Alinity ordered; continue to monitor sx and call for new/worsening sx.   Discuss RTW guidelines with manager  [] May continue to work  [] Follow up with PCP  [] Home until further instruction from hotline with Alinity results  INSTRUCTIONS PROVIDED:  [x]  Plan as noted above  []  Length of time to obtain results   []  Quarantine instructions  []  Masking protocol   []  S/S of worsening infection/condition and importance of prompt medical re-evaluation including when to seek emergency care  [] If symptoms develop, stay home and call hotline for rapid test order    Estimated RTW date:      [] The employee voiced understanding of above plan/instructions  [x] Manager Notified

## 2023-02-13 ENCOUNTER — TELEPHONE (OUTPATIENT)
Dept: INTERNAL MEDICINE CLINIC | Facility: HOSPITAL | Age: 68
End: 2023-02-13

## 2023-02-13 ENCOUNTER — LAB ENCOUNTER (OUTPATIENT)
Dept: LAB | Facility: HOSPITAL | Age: 68
End: 2023-02-13
Attending: PREVENTIVE MEDICINE
Payer: MEDICARE

## 2023-02-13 DIAGNOSIS — Z20.822 SUSPECTED 2019 NOVEL CORONAVIRUS INFECTION: Primary | ICD-10-CM

## 2023-02-13 DIAGNOSIS — Z20.822 SUSPECTED 2019 NOVEL CORONAVIRUS INFECTION: ICD-10-CM

## 2023-02-13 LAB — SARS-COV-2 RNA RESP QL NAA+PROBE: NOT DETECTED

## 2023-02-14 NOTE — TELEPHONE ENCOUNTER
Results and RTW guidelines:    COVID RESULT:    [x] Viewed by employee in [address] RTW plan and instructions as indicated on triage call. Manager notified. Estimated RTW date:   [] Discussed with employee   [] Unable to reach by phone. Sent via [employer] message      Test type:    [x] Rapid         [] Alinity         [] Outside test:       [x] NEGATIVE     Ordered Alinity retest?  []Yes   [x] No (skip to RTW)   Ordered Rapid retest?   []Yes   [x] No (skip to RTW)           Dated to be taken:      If Yes, PLACE ORDER NOW and instruct the following:  -Originally Symptomatic or Now Symptoms:   -RTW when sx improve- fever free for 24 hours w/o medications, Diarrhea/Vomiting for 24 hours w/o medications    -Originally  Asymptomatic  -Asymptomatic AND Vaccinated or Unvaccinated or Prior infection in past 90 days:   -May work and continue to monitor symptoms for the next 14 days.                                         -Rapid test day 2, rapid test day 5 (day 0 - exposure)    [] Positive     - Employee should quarantine at home for at least 5 days (day 1 is day after sx onset) , follow the CDC guidelines for cleaning and                              quarantining; see CDC.gov   -This employee may RTW on day 6 if asymptomatic or mildly symptomatic (with improving symptoms). Call Employee Health on day 5 if unable to return on day 6 after                      symptom onset.    -This employee needs to call Employee Health on day 5 after symptom onset. The employee needs to be cleared by Employee Health. - Monitor symptoms and temperature                 - Notify PCP of result                 - Seek emergent care with worsening symptoms   - If employee is still experiencing severe symptoms on day 5 must make a RTW appt with Employee Health, Employee will not be cleared if:    1. Has consistent cough, shortness of breath or fatigue that restricts your physical activities    2. Is still feeling \"unwell\"    3.  Within 15 days of hospitalization for COVID    4. Within 20 days of intubation for COVID    5. Still has a fever, vomiting or diarrhea   - Keep communication open with management about RTW and if symptoms worsen                - If outside testing completed, bring a copy of result to RTW appointment           Notes:     RTW PLAN:    []  If COVID positive results, off work minimum of 5 days from positive test or onset of symptoms (day 0)        On day 5, if asymptomatic or mildly symptomatic (with improving symptoms) may return to work day 6          On day 5, if symptomatic, call Employee Health for RTW screening        []  COVID positive result - call Employee Health on day 5 after symptom onset. The employee needs to be cleared by Employee Health to RTW. [] RTW immediately, continue to monitor for sx  [x] RTW when sx improve; must be fever free for 24 hours w/o medications, Diarrhea/Vomiting free for 24 hours w/o medications  [] Alinity ordered; continue to monitor sx and call for new/worsening sx.   Discuss RTW guidelines with manager  [] May continue to work  [] Follow up with PCP  [] Home until further instruction from hotline with Alinity results  INSTRUCTIONS PROVIDED:  [x]  Plan as noted above  []  Length of time to obtain results   []  Quarantine instructions  []  Masking protocol   []  S/S of worsening infection/condition and importance of prompt medical re-evaluation including when to seek emergency care  [] If symptoms develop, stay home and call hotline for rapid test order    Estimated RTW date:      [] The employee voiced understanding of above plan/instructions  [x] Manager Notified

## 2023-02-20 ENCOUNTER — HOSPITAL ENCOUNTER (OUTPATIENT)
Age: 68
Discharge: HOME | End: 2023-02-20
Payer: MEDICARE

## 2023-02-20 ENCOUNTER — TELEPHONE (OUTPATIENT)
Dept: INTERNAL MEDICINE CLINIC | Facility: HOSPITAL | Age: 68
End: 2023-02-20

## 2023-02-20 ENCOUNTER — LAB ENCOUNTER (OUTPATIENT)
Dept: LAB | Facility: HOSPITAL | Age: 68
End: 2023-02-20
Attending: PREVENTIVE MEDICINE
Payer: MEDICARE

## 2023-02-20 VITALS
SYSTOLIC BLOOD PRESSURE: 153 MMHG | TEMPERATURE: 97 F | DIASTOLIC BLOOD PRESSURE: 93 MMHG | RESPIRATION RATE: 16 BRPM | HEART RATE: 78 BPM | OXYGEN SATURATION: 98 %

## 2023-02-20 DIAGNOSIS — J06.9 VIRAL URI: ICD-10-CM

## 2023-02-20 DIAGNOSIS — Z20.822 SUSPECTED 2019 NOVEL CORONAVIRUS INFECTION: ICD-10-CM

## 2023-02-20 DIAGNOSIS — Z20.822 SUSPECTED 2019 NOVEL CORONAVIRUS INFECTION: Primary | ICD-10-CM

## 2023-02-20 DIAGNOSIS — R09.81 NASAL CONGESTION: Primary | ICD-10-CM

## 2023-02-20 LAB
POCT INFLUENZA A: NEGATIVE
POCT INFLUENZA B: NEGATIVE
S PYO AG THROAT QL: NEGATIVE
SARS-COV-2 RNA RESP QL NAA+PROBE: NOT DETECTED

## 2023-02-20 PROCEDURE — 99213 OFFICE O/P EST LOW 20 MIN: CPT | Performed by: NURSE PRACTITIONER

## 2023-02-20 PROCEDURE — 87880 STREP A ASSAY W/OPTIC: CPT | Performed by: NURSE PRACTITIONER

## 2023-02-20 PROCEDURE — 87502 INFLUENZA DNA AMP PROBE: CPT | Performed by: NURSE PRACTITIONER

## 2023-02-20 NOTE — ED INITIAL ASSESSMENT (HPI)
Covid test negative. Pt c/o nasal congestion, headache, sore throat which started Thursday night. On Friday, patient reports SOB but that has resolved.  Patient works at a hospital. Pt requesting flu and strep testing

## 2023-02-20 NOTE — TELEPHONE ENCOUNTER
Results and RTW guidelines:    COVID RESULT:    [x] Viewed by employee in [facility]. RTW plan and instructions as indicated on triage call. Manager notified. Estimated RTW date:   [] Discussed with employee   [] Unable to reach by phone. Sent via [employer] message      Test type:    [x] Rapid         [] Alinity         [] Outside test:       [x] NEGATIVE     Ordered Alinity retest?  []Yes   [x] No (skip to RTW)   Ordered Rapid retest?   []Yes   [x] No (skip to RTW)           Dated to be taken:      If Yes, PLACE ORDER NOW and instruct the following:  -Originally Symptomatic or Now Symptoms:   -RTW when sx improve- fever free for 24 hours w/o medications, Diarrhea/Vomiting for 24 hours w/o medications    -Originally  Asymptomatic  -Asymptomatic AND Vaccinated or Unvaccinated or Prior infection in past 90 days:   -May work and continue to monitor symptoms for the next 14 days.                                         -Rapid test day 2, rapid test day 5 (day 0 - exposure)    [] Positive     - Employee should quarantine at home for at least 5 days (day 1 is day after sx onset) , follow the CDC guidelines for cleaning and                              quarantining; see CDC.gov   -This employee may RTW on day 6 if asymptomatic or mildly symptomatic (with improving symptoms). Call Employee Health on day 5 if unable to return on day 6 after                      symptom onset.    -This employee needs to call Employee Health on day 5 after symptom onset. The employee needs to be cleared by Employee Health. - Monitor symptoms and temperature                 - Notify PCP of result                 - Seek emergent care with worsening symptoms   - If employee is still experiencing severe symptoms on day 5 must make a RTW appt with Employee Health, Employee will not be cleared if:    1. Has consistent cough, shortness of breath or fatigue that restricts your physical activities    2. Is still feeling \"unwell\"    3.  Within 15 days of hospitalization for COVID    4. Within 20 days of intubation for COVID    5. Still has a fever, vomiting or diarrhea   - Keep communication open with management about RTW and if symptoms worsen                - If outside testing completed, bring a copy of result to RTW appointment           Notes:     RTW PLAN:    []  If COVID positive results, off work minimum of 5 days from positive test or onset of symptoms (day 0)        On day 5, if asymptomatic or mildly symptomatic (with improving symptoms) may return to work day 6          On day 5, if symptomatic, call Employee Health for RTW screening        []  COVID positive result - call Employee Health on day 5 after symptom onset. The employee needs to be cleared by Employee Health to RTW. [x] RTW immediately, continue to monitor for sx  [] RTW when sx improve; must be fever free for 24 hours w/o medications, Diarrhea/Vomiting free for 24 hours w/o medications  [] Alinity ordered; continue to monitor sx and call for new/worsening sx.   Discuss RTW guidelines with manager  [] May continue to work  [x] Follow up with PCP  [] Home until further instruction from hotline with Alinity results  INSTRUCTIONS PROVIDED:  [x]  Plan as noted above  []  Length of time to obtain results   []  Quarantine instructions  []  Masking protocol   []  S/S of worsening infection/condition and importance of prompt medical re-evaluation including when to seek emergency care  [] If symptoms develop, stay home and call hotline for rapid test order    Estimated RTW date:      [] The employee voiced understanding of above plan/instructions  [x] Manager Notified

## 2023-04-20 ENCOUNTER — TELEPHONE (OUTPATIENT)
Facility: CLINIC | Age: 68
End: 2023-04-20

## 2023-04-20 NOTE — TELEPHONE ENCOUNTER
----- Message from Silvio Louise, [address] sent at 6/22/2018 12:44 PM CDT -----  Regarding: Recall colon   Recall colon in 5 years per GS.  Colon done 6-20-18

## 2023-07-06 ENCOUNTER — TELEPHONE (OUTPATIENT)
Facility: CLINIC | Age: 68
End: 2023-07-06

## 2023-07-06 DIAGNOSIS — Z86.010 PERSONAL HISTORY OF COLONIC POLYPS: Primary | ICD-10-CM

## 2023-07-06 NOTE — TELEPHONE ENCOUNTER
May schedule a colonoscopy for a history of colon polyps following a split dose Colyte preparation and either IV sedation or monitored anesthesia care per scheduling.

## 2023-07-07 NOTE — TELEPHONE ENCOUNTER
GI Schedulers    Please call the patient to schedule for colonoscopy. See MD orders below.     Thanks

## 2023-07-12 NOTE — TELEPHONE ENCOUNTER
Scheduled for:  Colonoscopy [place]  Provider Name:  Dr. Phyllis Cruz  Date:  9/27/2023  Location:  [facility]  Sedation:  MAC  Time:  9:30am, (pt is aware to arrive at 8:30AM)   Prep:  Colyte  Meds/Allergies Reconciled?:  Physician reviewed     Diagnosis with codes:  Hx of colon polyps Z86.010  Was patient informed to call insurance with codes (Y/N):  Yes, I confirmed MEDICARE insurance with this patient. Referral sent?:  Referral was sent at the time of electronic surgical scheduling. [facility] or [id number] notified?:  I sent an electronic request to Endo Scheduling and received a confirmation today.       Medication Orders:  n/a  Misc Orders:  n/a     Further instructions given by staff:   I discussed the prep instructions with the patient which she verbally understood and is aware that I will send the instructions via [employer]

## 2023-08-18 ENCOUNTER — LAB ENCOUNTER (OUTPATIENT)
Dept: LAB | Facility: HOSPITAL | Age: 68
End: 2023-08-18
Attending: INTERNAL MEDICINE
Payer: MEDICARE

## 2023-08-18 DIAGNOSIS — D64.9 ANEMIA: ICD-10-CM

## 2023-08-18 DIAGNOSIS — E55.9 VITAMIN D DEFICIENCY: ICD-10-CM

## 2023-08-18 DIAGNOSIS — E03.9 HYPOTHYROIDISM: ICD-10-CM

## 2023-08-18 DIAGNOSIS — E78.5 HYPERLIPIDEMIA: Primary | ICD-10-CM

## 2023-08-18 LAB
ALBUMIN SERPL-MCNC: 3.7 G/DL (ref 3.4–5)
ALBUMIN/GLOB SERPL: 0.9 {RATIO} (ref 1–2)
ALP LIVER SERPL-CCNC: 78 U/L
ALT SERPL-CCNC: 16 U/L
ANION GAP SERPL CALC-SCNC: 8 MMOL/L (ref 0–18)
AST SERPL-CCNC: 17 U/L (ref 15–37)
BASOPHILS # BLD AUTO: 0.06 X10(3) UL (ref 0–0.2)
BASOPHILS NFR BLD AUTO: 1.1 %
BILIRUB SERPL-MCNC: 0.4 MG/DL (ref 0.1–2)
BUN BLD-MCNC: 22 MG/DL (ref 7–18)
BUN/CREAT SERPL: 22.9 (ref 10–20)
CALCIUM BLD-MCNC: 9.1 MG/DL (ref 8.5–10.1)
CHLORIDE SERPL-SCNC: 104 MMOL/L (ref 98–112)
CHOLEST SERPL-MCNC: 252 MG/DL (ref ?–200)
CO2 SERPL-SCNC: 26 MMOL/L (ref 21–32)
CREAT BLD-MCNC: 0.96 MG/DL
DEPRECATED RDW RBC AUTO: 41.4 FL (ref 35.1–46.3)
EGFRCR SERPLBLD CKD-EPI 2021: 64 ML/MIN/1.73M2 (ref 60–?)
EOSINOPHIL # BLD AUTO: 0.32 X10(3) UL (ref 0–0.7)
EOSINOPHIL NFR BLD AUTO: 5.7 %
ERYTHROCYTE [DISTWIDTH] IN BLOOD BY AUTOMATED COUNT: 12.1 % (ref 11–15)
FASTING PATIENT LIPID ANSWER: YES
FASTING STATUS PATIENT QL REPORTED: YES
GLOBULIN PLAS-MCNC: 4 G/DL (ref 2.8–4.4)
GLUCOSE BLD-MCNC: 78 MG/DL (ref 70–99)
HCT VFR BLD AUTO: 38.9 %
HDLC SERPL-MCNC: 72 MG/DL (ref 40–59)
HGB BLD-MCNC: 12.8 G/DL
IMM GRANULOCYTES # BLD AUTO: 0.01 X10(3) UL (ref 0–1)
IMM GRANULOCYTES NFR BLD: 0.2 %
LDLC SERPL CALC-MCNC: 166 MG/DL (ref ?–100)
LYMPHOCYTES # BLD AUTO: 1.49 X10(3) UL (ref 1–4)
LYMPHOCYTES NFR BLD AUTO: 26.6 %
MCH RBC QN AUTO: 30.5 PG (ref 26–34)
MCHC RBC AUTO-ENTMCNC: 32.9 G/DL (ref 31–37)
MCV RBC AUTO: 92.6 FL
MONOCYTES # BLD AUTO: 0.54 X10(3) UL (ref 0.1–1)
MONOCYTES NFR BLD AUTO: 9.6 %
NEUTROPHILS # BLD AUTO: 3.19 X10 (3) UL (ref 1.5–7.7)
NEUTROPHILS # BLD AUTO: 3.19 X10(3) UL (ref 1.5–7.7)
NEUTROPHILS NFR BLD AUTO: 56.8 %
NONHDLC SERPL-MCNC: 180 MG/DL (ref ?–130)
OSMOLALITY SERPL CALC.SUM OF ELEC: 288 MOSM/KG (ref 275–295)
PLATELET # BLD AUTO: 223 10(3)UL (ref 150–450)
POTASSIUM SERPL-SCNC: 3.9 MMOL/L (ref 3.5–5.1)
PROT SERPL-MCNC: 7.7 G/DL (ref 6.4–8.2)
RBC # BLD AUTO: 4.2 X10(6)UL
SODIUM SERPL-SCNC: 138 MMOL/L (ref 136–145)
T4 FREE SERPL-MCNC: 1.2 NG/DL (ref 0.8–1.7)
TRIGL SERPL-MCNC: 82 MG/DL (ref 30–149)
TSI SER-ACNC: 1.7 MIU/ML (ref 0.36–3.74)
VIT D+METAB SERPL-MCNC: 57 NG/ML (ref 30–100)
VLDLC SERPL CALC-MCNC: 16 MG/DL (ref 0–30)
WBC # BLD AUTO: 5.6 X10(3) UL (ref 4–11)

## 2023-08-18 PROCEDURE — 80053 COMPREHEN METABOLIC PANEL: CPT

## 2023-08-18 PROCEDURE — 36415 COLL VENOUS BLD VENIPUNCTURE: CPT

## 2023-08-18 PROCEDURE — 82306 VITAMIN D 25 HYDROXY: CPT

## 2023-08-18 PROCEDURE — 84443 ASSAY THYROID STIM HORMONE: CPT

## 2023-08-18 PROCEDURE — 84439 ASSAY OF FREE THYROXINE: CPT

## 2023-08-18 PROCEDURE — 85025 COMPLETE CBC W/AUTO DIFF WBC: CPT

## 2023-08-18 PROCEDURE — 80061 LIPID PANEL: CPT

## 2023-09-27 ENCOUNTER — HOSPITAL ENCOUNTER (OUTPATIENT)
Facility: HOSPITAL | Age: 68
Setting detail: HOSPITAL OUTPATIENT SURGERY
Discharge: HOME | End: 2023-09-27
Attending: INTERNAL MEDICINE | Admitting: INTERNAL MEDICINE
Payer: MEDICARE

## 2023-09-27 ENCOUNTER — ANESTHESIA EVENT (OUTPATIENT)
Dept: ENDOSCOPY | Facility: HOSPITAL | Age: 68
End: 2023-09-27
Payer: MEDICARE

## 2023-09-27 ENCOUNTER — ANESTHESIA (OUTPATIENT)
Dept: ENDOSCOPY | Facility: HOSPITAL | Age: 68
End: 2023-09-27
Payer: MEDICARE

## 2023-09-27 VITALS
HEART RATE: 66 BPM | WEIGHT: 124 LBS | DIASTOLIC BLOOD PRESSURE: 77 MMHG | RESPIRATION RATE: 16 BRPM | OXYGEN SATURATION: 100 % | TEMPERATURE: 98 F | SYSTOLIC BLOOD PRESSURE: 155 MMHG | HEIGHT: 63 IN | BODY MASS INDEX: 21.97 KG/M2

## 2023-09-27 DIAGNOSIS — Z86.010 PERSONAL HISTORY OF COLONIC POLYPS: ICD-10-CM

## 2023-09-27 PROCEDURE — 0DBL8ZX EXCISION OF TRANSVERSE COLON, VIA NATURAL OR ARTIFICIAL OPENING ENDOSCOPIC, DIAGNOSTIC: ICD-10-PCS | Performed by: INTERNAL MEDICINE

## 2023-09-27 PROCEDURE — 45385 COLONOSCOPY W/LESION REMOVAL: CPT | Performed by: INTERNAL MEDICINE

## 2023-09-27 RX ORDER — LIDOCAINE HYDROCHLORIDE 10 MG/ML
INJECTION, SOLUTION EPIDURAL; INFILTRATION; INTRACAUDAL; PERINEURAL AS NEEDED
Status: DISCONTINUED | OUTPATIENT
Start: 2023-09-27 | End: 2023-09-27 | Stop reason: SURG

## 2023-09-27 RX ORDER — SODIUM CHLORIDE, SODIUM LACTATE, POTASSIUM CHLORIDE, CALCIUM CHLORIDE 600; 310; 30; 20 MG/100ML; MG/100ML; MG/100ML; MG/100ML
INJECTION, SOLUTION INTRAVENOUS CONTINUOUS
Status: DISCONTINUED | OUTPATIENT
Start: 2023-09-27 | End: 2023-09-27

## 2023-09-27 RX ADMIN — LIDOCAINE HYDROCHLORIDE 30 MG: 10 INJECTION, SOLUTION EPIDURAL; INFILTRATION; INTRACAUDAL; PERINEURAL at 09:36:00

## 2023-09-27 RX ADMIN — SODIUM CHLORIDE, SODIUM LACTATE, POTASSIUM CHLORIDE, CALCIUM CHLORIDE: 600; 310; 30; 20 INJECTION, SOLUTION INTRAVENOUS at 09:34:00

## 2023-09-27 RX ADMIN — SODIUM CHLORIDE, SODIUM LACTATE, POTASSIUM CHLORIDE, CALCIUM CHLORIDE: 600; 310; 30; 20 INJECTION, SOLUTION INTRAVENOUS at 10:06:00

## 2023-09-27 NOTE — OPERATIVE REPORT
[address] Endoscopy Report      Date of Procedure:  09/27/23      Preoperative Diagnosis:  Personal history of adenomatous colon polyp      Postoperative Diagnosis:  Diminutive/small colon polyps      Procedure:    Colonoscopy with polypectomy      Surgeon:  Alex Moreno M.D. Anesthesia:  Monitored anesthesia care  Cecal withdrawal time: 25 minutes  EBL:  Insignificant      Brief History: This is a 70year old female who presents for a surveillance colonoscopy in the setting of a remote history of a solitary subcentimeter tubular adenoma. The patient's last colonoscopy 5 years prior was negative for metachronous polyps. The patient has no new lower gastrointestinal tract symptoms or signs. Technique:  After informed consent, the patient was placed in the left lateral recumbent position. Digital rectal examination revealed no palpable intraluminal abnormalities. An Olympus variable stiffness 190 series HD pediatric colonoscope was inserted into the rectum and advanced under direct vision by following the lumen to the terminal ileum. The colon was examined upon withdrawal in the left lateral recumbent position. Findings:  The preparation of the colon was very good. The terminal ileum was examined for 5 cm and visually normal.  The ileocecal valve was well preserved. The visualized colonic mucosa from the cecum to the anal verge was normal with an intact vascular pattern. There were #two 4-5 mm sessile hyperplastic appearing polyps in the proximal and distal transverse colon which were cold snare excised and retrieved. No ongoing bleeding. There were no other colonic polyps, definite diverticula, mass lesions, vascular anomalies or signs of inflammation seen. Retroflexion in the rectum revealed no abnormalities. The procedure was well tolerated without immediate complication. Impression:  1. Diminutive/small colon polyps  2.   Otherwise normal colonoscopy to the terminal ileum    Recommendations: Follow-up biopsy results. Polyp histology to determine recommendations regarding follow-up.         Em Smart MD  9/27/2023

## 2023-09-27 NOTE — H&P
History & Physical Examination    Patient Name: Manny Hines  MRN: [id number]  CSN: [id number]  YOB: 1955    Diagnosis: Personal history of adenomatous colon polyp      PARoxetine 10 MG Oral Tab, Take by mouth., Disp: , Rfl: , 2023 at 0430  SYNTHROID 112 MCG Oral Tab, , Disp: , Rfl: , 2023 at 0430  [] PEG 3350-KCl-NaBcb-NaCl-NaSulf (Colyte with Flavor Packs) 240 GM Oral Solution Reconstituted, Take 4,000 mL by mouth one time for 1 dose., Disp: 4000 mL, Rfl: 0  metoclopramide 10 MG Oral Tab, Take 1 tablet (10 mg total) by mouth 3 (three) times daily as needed (for nausea or headache). , Disp: 10 tablet, Rfl: 0  Dicyclomine HCl 20 MG Oral Tab, Take 1 tablet (20 mg total) by mouth 4 (four) times daily as needed. , Disp: 30 tablet, Rfl: 1,  at prn  ergocalciferol 11016 units Oral Cap, , Disp: , Rfl: , 2023      lactated ringers infusion, , Intravenous, Continuous        Allergies:   Codeine                     Comment:Other reaction(s): nausea, sweating    Past Medical History:   Diagnosis Date    Anxiety state     Asthma     Disorder of thyroid     High cholesterol     Right shoulder injury     physical therapy    Thyroid disease      Past Surgical History:   Procedure Laterality Date    COLONOSCOPY  10/06/2012    Per [facility]    COLONOSCOPY N/A 2018    Procedure: COLONOSCOPY;  Surgeon: Lorrie Geiger MD;  Location: [address] [place] [address] ENDOSCOPY    COLONOSCOPY & POLYPECTOMY  10/6/2012    [facility]       History reviewed. No pertinent family history. Social History    Tobacco Use      Smoking status: Never      Smokeless tobacco: Never    Alcohol use:  Yes      Alcohol/week: 0.0 standard drinks of alcohol      Comment: social      SYSTEM Check if Review is Normal Check if Physical Exam is Normal If not normal, please explain:   HEENT Ravi[employer]Bacca ] [ Betha Most  Ravi.Bacca ] [ Nice Lala Ravi[employer]Bacca ] [ Versa Pair [employer]avi[employer]Bacca ] [ Parker Grills Ravi[employer]Bacca ] [ Dawn Blinks Ravi[employer]Bacca ] [ Dann Olszewski        [ x ] I have discussed the risks and benefits and alternatives with the patient/family. They understand and agree to proceed with plan of care. [ x ] I have reviewed the History and Physical done within the last 30 days. Any changes noted above.     Claribel Geller MD  9/27/2023  9:38 AM

## 2023-10-02 ENCOUNTER — MED REC SCAN ONLY (OUTPATIENT)
Facility: CLINIC | Age: 68
End: 2023-10-02

## 2023-10-04 ENCOUNTER — TELEPHONE (OUTPATIENT)
Facility: CLINIC | Age: 68
End: 2023-10-04

## 2023-10-04 NOTE — TELEPHONE ENCOUNTER
Health maintenance updated. Last colonoscopy done 9/27/2023 by Dr Hung Shannon    No recall per [facility].

## 2024-07-27 ENCOUNTER — APPOINTMENT (OUTPATIENT)
Dept: GENERAL RADIOLOGY | Facility: HOSPITAL | Age: 69
End: 2024-07-27
Attending: EMERGENCY MEDICINE
Payer: MEDICARE

## 2024-07-27 ENCOUNTER — HOSPITAL ENCOUNTER (EMERGENCY)
Facility: HOSPITAL | Age: 69
Discharge: HOME | End: 2024-07-27
Attending: EMERGENCY MEDICINE
Payer: MEDICARE

## 2024-07-27 VITALS
SYSTOLIC BLOOD PRESSURE: 170 MMHG | RESPIRATION RATE: 20 BRPM | HEART RATE: 68 BPM | OXYGEN SATURATION: 98 % | DIASTOLIC BLOOD PRESSURE: 91 MMHG | TEMPERATURE: 98 F

## 2024-07-27 DIAGNOSIS — S52.502A CLOSED FRACTURE OF DISTAL END OF LEFT RADIUS, UNSPECIFIED FRACTURE MORPHOLOGY, INITIAL ENCOUNTER: Primary | ICD-10-CM

## 2024-07-27 PROCEDURE — 73110 X-RAY EXAM OF WRIST: CPT | Performed by: EMERGENCY MEDICINE

## 2024-07-27 PROCEDURE — 99284 EMERGENCY DEPT VISIT MOD MDM: CPT

## 2024-07-27 PROCEDURE — 29125 APPL SHORT ARM SPLINT STATIC: CPT

## 2024-07-27 RX ORDER — HYDROCODONE BITARTRATE AND ACETAMINOPHEN 5; 325 MG/1; MG/1
1 TABLET ORAL EVERY 6 HOURS PRN
Qty: 10 TABLET | Refills: 0 | Status: SHIPPED | OUTPATIENT
Start: 2024-07-27 | End: 2024-08-03

## 2024-07-27 RX ORDER — IBUPROFEN 600 MG/1
600 TABLET ORAL ONCE
Status: COMPLETED | OUTPATIENT
Start: 2024-07-27 | End: 2024-07-27

## 2024-07-28 NOTE — ED QUICK NOTES
Patient and spouse verbalize understanding of discharge instructions, supportive care, follow-up with ortho

## 2024-07-28 NOTE — DISCHARGE INSTRUCTIONS
Please take the medication as prescribed.  Please do not operate heavy machinery, drive a car, exercise, or perform important tasks while you are taking this medication as it can make you drowsy and prone to mental lapses.  This medication can be addictive so please only take as needed.  This medication can cause your breathing to dangerously slow down if you take too much, so please take only as prescribed.  Medication can make you constipated, so please stay well-hydrated and take fiber supplements.  Return to the emergency department if this medication does not control your pain and be sure to follow-up with your primary care provider as advised.

## 2024-07-28 NOTE — ED PROVIDER NOTES
Patient Seen in: [facility] Emergency Department      History     Chief Complaint   Patient presents with    Wrist Injury     Stated Complaint: arm injury    Subjective:   HPI  68-year-old female who is right hand dominant who presents for evaluation of left wrist injury.  Today she was dancing, and fell landing with the left wrist striking the ground.  No loss of strength or sensation.  No blood thinners, no other complaints.      Objective:   Past Medical History:    Anxiety state    Asthma (HCC)    Disorder of thyroid    High cholesterol    Right shoulder injury    physical therapy    Thyroid disease              Past Surgical History:   Procedure Laterality Date    Colonoscopy  10/06/2012    Per [facility]    Colonoscopy N/A 6/20/2018    Procedure: COLONOSCOPY;  Surgeon: Rick Gar MD;  Location: [facility] ENDOSCOPY    Colonoscopy N/A 9/27/2023    Procedure: COLONOSCOPY;  Surgeon: Rick Gar MD;  Location: [facility] ENDOSCOPY    Colonoscopy & polypectomy  10/6/2012    D & c                  Social History     Socioeconomic History    Marital status:    Tobacco Use    Smoking status: Never    Smokeless tobacco: Never   Substance and Sexual Activity    Alcohol use: Yes     Alcohol/week: 0.0 standard drinks of alcohol     Comment: social    Drug use: No   Other Topics Concern    Caffeine Concern Yes     Comment: coffee    Pt has a pacemaker No    Pt has a defibrillator No    Reaction to local anesthetic No              Review of Systems    Positive for stated Chief Complaint: Wrist Injury    Other systems are as noted in HPI.  Constitutional and vital signs reviewed.      All other systems reviewed and negative except as noted above.    Physical Exam     ED Triage Vitals [07/27/24 1959]   BP (!) 186/80   Pulse 71   Resp 20   Temp 98.4 °F (36.9 °C)   Temp src    SpO2 99 %   O2 Device None (Room air)       Current Vitals:   Vital Signs  BP: (!) 170/91  Pulse: 68  Resp: 20  Temp: 98.4 °F (36.9  °C)  MAP (mmHg): (!) 112    Oxygen Therapy  SpO2: 98 %  O2 Device: None (Room air)            Physical Exam  Vitals and nursing note reviewed.   Constitutional:       Appearance: She is well-developed.   HENT:      Head: Normocephalic and atraumatic.   Eyes:      Extraocular Movements: Extraocular movements intact.   Cardiovascular:      Rate and Rhythm: Normal rate.      Comments: Left radial pulse 2+  Pulmonary:      Effort: Pulmonary effort is normal.   Musculoskeletal:      Cervical back: Normal range of motion.      Comments: Tender to palpation at the left distal wrist predominantly at the ulnar aspect without open wound or lesion, there is a small amount of edema to the area.  Nontender over the scaphoid, forearm or elbow.  Full range of motion at the elbow, and shoulder.  Cap refill less than 2 seconds at all 5 fingers.  Left hand neurovascular intact in the distributions of the median, radial, ulnar nerves.   Skin:     General: Skin is warm.   Neurological:      Mental Status: She is alert.      Comments: No focal deficits       Differential diagnosis includes but is not limited to fracture, contusion, dislocation        ED Course   Labs Reviewed - No data to display          XR WRIST COMPLETE (MIN 3 VIEWS), LEFT (CPT=73110)    Result Date: 7/27/2024  CONCLUSION:  1. A subtle acute nondisplaced distal radius fracture with minimal trabecular impaction adjacent to physeal scar. 2. 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     Dictated by (CST): Anselmo Rubio MD on 7/27/2024 at 8:21 PM     Finalized by (CST): Anselmo Rubio MD on 7/27/2024 at 8:23 PM                  MDM          A sugar tong splint was applied to the LUE by ED PCT.  After application of the splint I returned and re-examined the patient.  The splint was adequately immobilizing the joint and distal to the splint the patient's circulation and sensation was intact.                               Medical Decision  Making  Patient well-appearing without evidence of neurovascular compromise.  Wedding ring was removed.  Per my independent interpretation of left wrist x-ray there is nondisplaced distal radius fracture.  Patient was splinted as below.  Advised Tylenol or ibuprofen as needed, Norco for breakthrough pain and outpatient follow-up with orthopedics and she is comfortable with the plan.      Problems Addressed:  Closed fracture of distal end of left radius, unspecified fracture morphology, initial encounter: acute illness or injury with systemic symptoms    Amount and/or Complexity of Data Reviewed  Radiology: ordered and independent interpretation performed. Decision-making details documented in ED Course.    Risk  Prescription drug management.  Decision regarding hospitalization.  Risk Details: [place]  reviewed  No indication for hospitalization         Disposition and Plan     Clinical Impression:  1. Closed fracture of distal end of left radius, unspecified fracture morphology, initial encounter         Disposition:  Discharge  7/27/2024  8:42 pm    Follow-up:  Yordy Sharif MD  [address] [place] [id number]  [phone]    Follow up in 1 week(s)      We recommend that you schedule follow up care with a primary care provider within the next three months to obtain basic health screening including reassessment of your blood pressure.      Medications Prescribed:  Discharge Medication List as of 7/27/2024  9:01 PM        START taking these medications    Details   HYDROcodone-acetaminophen 5-325 MG Oral Tab Take 1 tablet by mouth every 6 (six) hours as needed., Normal, Disp-10 tablet, R-0

## 2024-07-28 NOTE — ED QUICK NOTES
Dr. Albarado at bedside discussing test results with patient and family, states she has followed-up with Dr. Sharif in the past

## 2024-07-29 ENCOUNTER — TELEPHONE (OUTPATIENT)
Dept: ORTHOPEDICS CLINIC | Facility: CLINIC | Age: 69
End: 2024-07-29

## 2024-07-29 ENCOUNTER — OFFICE VISIT (OUTPATIENT)
Dept: ORTHOPEDICS CLINIC | Facility: CLINIC | Age: 69
End: 2024-07-29

## 2024-07-29 DIAGNOSIS — S52.572A CLOSED INTRA-ARTICULAR DIE-PUNCH FRACTURE OF LEFT RADIUS, INITIAL ENCOUNTER: Primary | ICD-10-CM

## 2024-07-29 PROCEDURE — 99204 OFFICE O/P NEW MOD 45 MIN: CPT | Performed by: STUDENT IN AN ORGANIZED HEALTH CARE EDUCATION/TRAINING PROGRAM

## 2024-07-29 RX ORDER — TRAMADOL HYDROCHLORIDE 50 MG/1
50 TABLET ORAL EVERY 12 HOURS PRN
Qty: 8 TABLET | Refills: 0 | Status: SHIPPED | OUTPATIENT
Start: 2024-07-29

## 2024-07-29 NOTE — PROGRESS NOTES
Orthopaedic Surgery New Patient Visit  _____________________________________________________________________________________________________  _____________________________________________________________________________________________________    DATE OF VISIT: 7/29/2024     CHIEF COMPLAINT:   Chief Complaint   Patient presents with    Consult     Pt. Presents for a consult for left wrist fracture that occurred Saturday evening. Pt. States that she had bad shoes, slipped and broke her left wrist. Pain 2/10        HISTORY OF PRESENT ILLNESS: Darshana Rosario is a 68 year old female who presents to the clinic for left distal radius fracture.  She reports that approximately 2 days ago she was at a event wearing unstable shoes which slipped out from under her, resulting in a fall directly onto her left wrist.  She experienced immediate pain and swelling about the wrist.  She was seen in urgent care and placed in a sugar-tong splint.  Since that time, she has been elevating, icing, and resting.  She denies any neurologic symptoms.  Pain is overall well-controlled, currently 2 out of 10 in severity.    SOCIAL HISTORY  Social History     Socioeconomic History    Marital status:      Spouse name: Not on file    Number of children: Not on file    Years of education: Not on file    Highest education level: Not on file   Occupational History    Not on file   Tobacco Use    Smoking status: Never    Smokeless tobacco: Never   Substance and Sexual Activity    Alcohol use: Yes     Alcohol/week: 0.0 standard drinks of alcohol     Comment: social    Drug use: No    Sexual activity: Not on file   Other Topics Concern     Service Not Asked    Blood Transfusions Not Asked    Caffeine Concern Yes     Comment: coffee    Occupational Exposure Not Asked    Hobby Hazards Not Asked    Sleep Concern Not Asked    Stress Concern Not Asked    Weight Concern Not Asked    Special Diet Not Asked    Back Care Not Asked    Exercise  Not Asked    Bike Helmet Not Asked    Seat Belt Not Asked    Self-Exams Not Asked    Grew up on a farm Not Asked    History of tanning Not Asked    Outdoor occupation Not Asked    Pt has a pacemaker No    Pt has a defibrillator No    Breast feeding Not Asked    Reaction to local anesthetic No   Social History Narrative    Not on file     Social Determinants of Health     Financial Resource Strain: Not on file   Food Insecurity: Not on file   Transportation Needs: Not on file   Physical Activity: Not on file   Stress: Not on file   Social Connections: Not on file   Housing Stability: Not on file        PAST MEDICAL HISTORY  Past Medical History:    Anxiety state    Asthma (HCC)    Disorder of thyroid    High cholesterol    Right shoulder injury    physical therapy    Thyroid disease        PAST SURGICAL HISTORY  Past Surgical History:   Procedure Laterality Date    Colonoscopy  10/06/2012    Per [facility]    Colonoscopy N/A 6/20/2018    Procedure: COLONOSCOPY;  Surgeon: Rick Gar MD;  Location: [facility] ENDOSCOPY    Colonoscopy N/A 9/27/2023    Procedure: COLONOSCOPY;  Surgeon: Rick Gar MD;  Location: [facility] ENDOSCOPY    Colonoscopy & polypectomy  10/6/2012    D & c          MEDICATIONS  * Reviewed   traMADol 50 MG Oral Tab Take 1 tablet (50 mg total) by mouth every 12 (twelve) hours as needed for Pain. 8 tablet 0    ergocalciferol 54002 units Oral Cap       PARoxetine 10 MG Oral Tab Take by mouth.      SYNTHROID 112 MCG Oral Tab           ALLERGIES  Allergies   Allergen Reactions    Codeine      Other reaction(s): nausea, sweating        FAMILY HISTORY  History reviewed. No pertinent family history.     REVIEW OF SYSTEMS  A 14 point review of systems was performed. Pertinent positives and negatives noted in the HPI.    PHYSICAL EXAM  There were no vitals taken for this visit.     Constitutional: The patient is well-developed, well-nourished, in no acute distress.  Neurological: Alert and oriented to  person, place, and time.  Psychiatric: Mood and affect normal.  Head: Normocephalic and atraumatic.  Cardiovascular: regular rate by palpation  Pulmonary/Chest: Effort normal. No respiratory distress. Breathing non-labored  Abdominal: Abdomen exhibits no distension.   Left  Wrist  Inspection/Palpation  No gross deformity  Mild swelling of hand and fingers  No breaks in exposed skin. Skin is euthermic  ROM  Wrist: Diminished motion  Fingers: Diminished finger motion   Motor  Fires AIN/PIN/U/M/recurrent motor branch  Sensory  SILT R/U/M distributions   Fingers warm and well perfused, <2s capillary refill in all digits       RESULTS    Lab Results   Component Value Date    WBC 5.6 08/18/2023    HGB 12.8 08/18/2023    .0 08/18/2023      Lab Results   Component Value Date    GLU 78 08/18/2023    BUN 22 (H) 08/18/2023    CREATSERUM 0.96 08/18/2023    GFR >60 08/05/2015    GFRNAA 58 (L) 05/13/2022    GFRAA 67 05/13/2022        IMAGING  I independently viewed and interpreted the imaging. Radiologist interpretation is available in the imaging report.  X-ray: Plain films of the left wrist demonstrate minimally displaced intra-articular distal radius fracture impacting the joint with no cortical disruption    XR WRIST COMPLETE (MIN 3 VIEWS), LEFT (CPT=73110)    Result Date: 7/27/2024  PROCEDURE: XR WRIST COMPLETE (MIN 3 VIEWS), LEFT (CPT=73110)  COMPARISON: None.  INDICATIONS: Left ulnar and radial wrist pain post-fall today.  TECHNIQUE: 3 views were obtained.           CONCLUSION:  1. A subtle acute nondisplaced distal radius fracture with minimal trabecular impaction adjacent to physeal scar. 2. 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     Dictated by (CST): Anselmo Rubio MD on 7/27/2024 at 8:21 PM     Finalized by (CST): Anselmo Rubio MD on 7/27/2024 at 8:23 PM             ASSESSMENT/PLAN: Darshana Rosario is a 68 year old female who presents to the Orthopaedic surgery clinic today for left  distal radius joint impaction fracture.  We discussed management options for this injury and after discussion of all options, we will plan to place her into a removable wrist splint which she may remove for hygiene and for sleep.  We will plan to follow-up in approximately 1 month for evaluation of progression of her motion and for repeat plain films.    Discussed the history, physical exam, treatment to date, and reviewed relevant imaging an studies with the patient.  WEIGHT BEARING STATUS: Partial weightbearing (<3lbs)  RANGE-OF-MOTION LIMITATIONS: immobilized in removable wrist splint.  When removed, may range of motion as tolerated  NEW PRESCRIPTIONS:  We discussed medications for this condition including patient current regimen. Based on this discussion we have added/re-ordered tramadol for breakthrough pain  IMAGING ORDERED: none  CONSULTS PLACED: no new consultations  PROSTHESES/ORTHOTICS: based on the condition and patient's function/needs, we have ordered removable wrist splint  PROCEDURES: none    FOLLOW-UP:  4 weeks    RADIOGRAPHS AT NEXT VISIT:  3 view left wrist    I have personally seen Darshana Rosario and discussed in detail their plan of care. Prior to departure, they indicated agreement with and understanding of their plan of care and their follow-up as documented herein this note. Please note that this note was written in combination with voice recognition/dictation software and there is a possibility of transcription errors which were not identified at the time of note submission. If clarification is necessary, please contact the author or clinic staff.    Kayden Enriquez MD  Orthopaedic Surgery  7/29/2024

## 2024-07-29 NOTE — PATIENT INSTRUCTIONS
EXTREMITY FRACTURE INFORMATION PACKET  Dr. Kayden Enriquez  Note: this information packet goes into detail regarding surgical treatment, but is also relevant to understanding your diagnosis and non-operative treatment options  FRACTURES  Fractures, or broken bones, are common and occur in people of all ages. They may be a result of overuse (stress fracture), a fault in the bone (pathologic fracture), or from trauma such as a fall, sporting injury, or motor vehicle collision. While any bone in the body may be fractured, the most common ones we see and treat are in the arms and legs near the joints (e.g., wrists, elbows, shoulder, hip, ankle). This is because the bone in these regions is less dense and more susceptible to injury.    When fractures occur, they are typically very painful. There are nerves in/around the bones that are damaged when the bone breaks. Additionally, there are blood vessels in the bones that are damaged by a fracture and cause a large amount of swelling in the area around the injury, which contrinutes to more pain even after the bone is placed into a splint or sling.     WORKUP OF FRACTURES  Your physician will perform a history and physical examination. This examination will help to identify risk factors for injury/reinjury and can identify other injuries. Your examination will include assessment of your motion, strength, and the function of your nerves.   You will often have X-rays performed before you see your Orthopaedic surgeon, even if others were recently taken. These X-rays will help us to assess the position of the bone, whether there are any other fractures or dislocations, and whether the bones have moved since your last X-rays. While X-rays do not show the muscles, ligaments, and tendons, some signs on these X-rays can also be used to identify an injury to these structures. Your surgeon may also order a computed tomography (CT) scan to look at the joint surface or for operative  planning. These scans are not required in all patients.                   X-Ray                       CT Scan       Repair  Tibial Plateau (Knee) Fracture      TREATMENT  The treatment for a fracture depends on several factors including the fracture location, the fracture pattern (what the X-rays look like), your age/health and risk factors, and your activity level/functional needs. Your surgeon should have a discussion with you about all of these to help you come to the decision of whether to undergo surgery. When treated without surgery, your bone will be immobilized in a sling, splint, or cast to keep it from moving, which helps decrease pain and accelerate healing. These will generally remain in place until your bone is healed/stable enough to discontinue the cast/sling, which may take several weeks to months. If fractures do not heal appropriately through this treatment, a surgery may still become necessary.      X-Ray          Repair  Distal Humerus (Elbow) Fracture  Surgery for fractures may include using removable wires to pin the bone into an appropriate position, using metal plates and screws or rods to hold the bone in position, or replacing the affected joint. These treatment options are typically based on the fracture pattern, the blood supply to the joint, and your rehabilitation needs.   Some common fractures and treatments are discussed below.    ANKLE FRACTURES                                      X-Ray                                    Repair  Ankle fractures are typically fractures of the lower portion of the fibula but can involve the tibia and frequently include injury to the ligaments of your ankle. These injuries frequently occur with a twisting motion like a bad sprain, and some can be treated very similar to an ankle sprain. In fact, there are several types of ankle fracture that can be treated with a walking boot or cast. When treated without surgery, you may be in a boot or cast for about  6-8 weeks before starting to transition back to normal walking, though this can vary. As these increase in severity (see above), the ankle joint becomes unstable and a cast may not be sufficient to help the joint stay in position. If the fracture involves the joint surface of the ankle, these are called pilon fractures and frequently necessitate surgery to keep the joint surface smooth and prevent rapid progression pf arthritis. In these settings, your surgeon will often recommend open reduction and internal fixation of the fracture. Your surgeon will have you wait 2-3 weeks after injury to have ankle fracture surgery to give time for swelling to decrease. Too much swelling will lead to healing issues in the lower leg.  During the surgery, your surgeon will correct the bone alignment, place a plate and screws, assess the ligaments, and repair/secure these as needed.     DISTAL RADIUS/WRIST FRACTURES    X-Rays of repaired distal radius  Fractures of the distal radius are grouped as those that enter the joint surface (intra-articular) and those that do not (extra-articular). Extra-articular/non-joint fractures can frequently be managed without surgery as long as the overall alignment of your wrist is reasonable. Intra-articular fractures are typically managed with surgery to protect function and prevent wrist instability or progression to arthritis. However, in persons who are older (there is not a specific agreed-upon age range) and have less functional demands on their hands, these do just as well without surgery. When non-operative management is performed, you will usually remain in a splint for the first 2 weeks after injury and then switch over to a cast when the swelling has decreased. You will typically be in a splint or cast for 6-8 weeks from the time of injury, with very limited functionality during that time. When surgery is performed, your surgeon will correct the bone alignment, place a plate and screws  and/or pins, and assess the ligaments and repair/secure these as needed. After the surgery, you will typically be placed in a wrist splint for 2 weeks and then transition to a removable splint to be worn when outside. With both operative and nonoperative management, the wrist will frequently become stiff and you may need therapy to help restore motion.    PROXIMAL HUMERUS/SHOULDER FRACTURES    X-Ray         Repair with plate/screws        X-Ray             Repair with intramedullary homa  Fractures of the proximal (upper) humerus are most commonly managed without surgery unless the fractured parts are substantially displaced in a way that will compromise your function even when healed. When managed without surgery, you will usually be placed in a sling for about 8 weeks with therapy to help protect your motion and progress your function. If your shoulder bones are severely displaced, the blood supply is injured, or your shoulder is both fractured and dislocated (moved out of its socket), your surgeon may recommend surgery. Depending on the fracture and the quality of your bone, your injury can be addressed with pinning, plates and screws, an intramedullary homa, or a shoulder replacement. Shoulder replacements are a good option for persons with pre-existing shoulder pain or fractures with many pieces too small to hold a screw. The recovery protocol for each of these procedures varies and your surgeon will discuss these with you in detail.    PROXIMAL HUMERUS/SHOULDER FRACTURES      X-Ray          Repair (Side view)   Repair (Front view)  Fractures of the shaft of the humerus are also commonly managed without surgery except for when the nerve is injured, the fracture is severely out of alignment, or when the fracture is still unstable after 6 weeks. Additionally, distal fractures (lower end) often require surgery. When managed without surgery, you will usually be placed in a splint initially and then transitioned to a  fracture brace (sometimes called a Nice brace) for a period of 8-10 weeks. You will also work with therapy to help protect your motion and progress your function. At about 6 weeks from injury, you should no longer have motion between your fracture ends (the upper and lower part of the arm)--if you still do, it's unlikely that the bone will heal.  In this setting and with severe displacement of the fracture, your surgeon may recommend surgery. Most often, your injury can be addressed with plates and screws or an intramedullary homa followed by a splint for 2 weeks and then usually a return to weightbearing and motion after your wound check.    RISKS OF SURGERY  Fracture surgeries are overall safe, but there are some risks to be aware of. Some of these risks depend on the type of anesthesia used as some patients may react to certain types of anesthesia. Additionally, while these are risks of surgery, there are many similar risks with nonoperative management, particularly including persistent pain and failure of the bone to heal appropriately. Potential risks of the surgery include:  Pain - Some amount of pain is normal after any surgery but should be manageable with your post-operative pain protocol. While this pain almost always resolves within the first few weeks, some activities may remain bothersome and painful until your rehabilitation process is nearly complete. If you have severe pain not controlled on your postoperative pain regimen, you should let your surgeon know.  Infection - Infection after fracture surgery is uncommon (<3%) but, when it does occur, is a major issue. Sometimes this can be managed with a brief course of antibiotics. However, in other cases, the fracture site may need to undergo one or more procedures to clean out the infection. If the infection has been present for multiple weeks, it may require a complete replacement of all the implants, which is a large surgery and will set back your  healing. To help avoid this risk, on the day of surgery, you will be given antibiotics and your skin and bone will be thoroughly cleaned in the operating room. For arthroplasty surgeries, your surgeon will also place additional cleaning solution and antibiotics into the wound unless you have an allergy to these.  Bleeding - Any time an incision is made to the skin, there will be some bleeding. You will typically lose a very small amount during this surgery.  Nerve Injuries - Injuries to the nerves are uncommon but very serious. The nerves are delicate and sensitive and can become injured even without cutting them, such as when traction is put on them to pull the out of the way. During fracture repair procedures, your surgeon will operate in close proximity to the nerves and will take care to avoid injury to them. While many times, injured nerves may recover to some extent, you may be referred to a nerve specialist if an injury occurs to check on the healing and anticipated recovery.  Stiffness - Any time a surgery is performed, the body attempts to heal by creating scar tissue. While this is very important to the process of healing, it also sets the joint near the fracture up for stiffness. This stiffness is exacerbated by the use of the splint/sling to protect your limb during early healing. Too much scar tissue will cause pain and loss of motion. It will be important to work with your therapist to progress motion to prevent stiffness while protecting your surgery. If substantial stiffness does occur, additional interventions such as steroid injections and/or manipulation can be performed.   Delayed Union and Nonunion - Occasionally the fracture may not heal after surgery. If this occurs, you may work with your surgeon to determine the cause of the nonunion and whether additional surgical interventions are necessary. To minimize the risk, you should carefully adhere to the rehabilitation protocol, eat a nutritious  and well-balanced diet, and avoid tobacco use.    REHABILITATION AFTER SURGERY  The course of recovery following surgery depends somewhat upon the type of procedure the surgeon performs. All patients will have some amount of pain from the surgery that will be managed with medications, ice, and elevation. Depending on your occupation, commute, and demands, you may be able to return to work within days to weeks after surgery. However, it may take several months for your fractured extremity to feel normal again.      MEDICATIONS AFTER FRACTURES AND SURGERY  Your medication regimen for fractures treated with or without surgery will include medications for pain, nausea, and bowel health. Blood clots after fractures are common so your surgeon will typically put you on a course of anticoagulation to decrease this risk. You may be given a medication to help protect your stomach while taking aspirin unless you are already taking one. To minimize narcotic use and establish better pain control, we employ a multimodal pain approach. This protocol combines the use of scheduled acetaminophen, gabapentin, and narcotics.  We will often use anti-inflammatories (NSAID's such as ibuprofen, celecoxib, and/or naproxen) to further assist with pain control thus allowing for a lower dose of narcotic to be used. Dosing of medications will vary from patient to patient based on their needs and past medical history, so please refer to your medication list. In general, you will also receive vitamin D to improve bone health and bone density, which may also help with healing and protect from future fractures. If you take any other medications at baseline, please discuss these with your surgeon, pharmacist, or primary care manager.     ICE PACKS AFTER FRACTURES AND SURGERY   Icing can be helpful after both fractures and fracture surgery to alleviate pain and swelling. If using an ice pack, you should apply this frequently in the first week after  injury/surgery. If you receive a nerve block from anesthesia (for a surgery), ensure you can feel the skin (the block has worn off) to prevent frostbite injuries. Ice bags/packs should be used for about 20 minutes every 3-4 hours during waking hours and should not be left on when sleeping. It may be used more often at first if pain/inflammation are intense. It is often helpful to protect your skin from frostbite with a washcloth, towel, or ace wrap between the ice bag/pack and your skin.    INFORMATION ABOUT YOUR SURGEON  Dr. Kayden Enriquez is an Orthopaedic surgeon with advanced skills in orthopaedic sports, trauma, and upper limb surgery. He completed his training at [facility]. He has authored over 50 peer-review papers and several book chapters advancing surgical techniques, surgeon education, and patient treatment. 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                         X-Ray           Repair  Clavicle (Collar Bone) Fracture

## 2024-07-29 NOTE — TELEPHONE ENCOUNTER
Patient requesting a note to [facility] that states she has a broken wrist and she can pause her membership due to this diagnosis while she heals. Patient requests the note has a timeframe that she will be unable to play tennis. Patient requests note is uploaded to [employer]. Please advise.

## 2024-07-30 NOTE — TELEPHONE ENCOUNTER
Kayden Enriquez MD  You; [facility] Clinical Staff14 hours ago (10:33 PM)       2 months should be reasonable

## 2024-08-01 ENCOUNTER — LAB ENCOUNTER (OUTPATIENT)
Dept: LAB | Facility: HOSPITAL | Age: 69
End: 2024-08-01
Attending: INTERNAL MEDICINE
Payer: MEDICARE

## 2024-08-01 DIAGNOSIS — E55.9 VITAMIN D DEFICIENCY: ICD-10-CM

## 2024-08-01 DIAGNOSIS — E78.5 HYPERLIPIDEMIA: Primary | ICD-10-CM

## 2024-08-01 DIAGNOSIS — E03.9 HYPOTHYROIDISM: ICD-10-CM

## 2024-08-01 LAB
ALBUMIN SERPL-MCNC: 4.7 G/DL (ref 3.2–4.8)
ALBUMIN/GLOB SERPL: 1.4 {RATIO} (ref 1–2)
ALP LIVER SERPL-CCNC: 80 U/L
ALT SERPL-CCNC: 8 U/L
ANION GAP SERPL CALC-SCNC: 6 MMOL/L (ref 0–18)
AST SERPL-CCNC: 15 U/L (ref ?–34)
BASOPHILS # BLD AUTO: 0.05 X10(3) UL (ref 0–0.2)
BASOPHILS NFR BLD AUTO: 0.7 %
BILIRUB SERPL-MCNC: 0.7 MG/DL (ref 0.2–1.1)
BUN BLD-MCNC: 17 MG/DL (ref 9–23)
BUN/CREAT SERPL: 18.7 (ref 10–20)
CALCIUM BLD-MCNC: 10 MG/DL (ref 8.7–10.4)
CHLORIDE SERPL-SCNC: 101 MMOL/L (ref 98–112)
CHOLEST SERPL-MCNC: 256 MG/DL (ref ?–200)
CO2 SERPL-SCNC: 28 MMOL/L (ref 21–32)
CREAT BLD-MCNC: 0.91 MG/DL
DEPRECATED RDW RBC AUTO: 45.1 FL (ref 35.1–46.3)
EGFRCR SERPLBLD CKD-EPI 2021: 68 ML/MIN/1.73M2 (ref 60–?)
EOSINOPHIL # BLD AUTO: 0.35 X10(3) UL (ref 0–0.7)
EOSINOPHIL NFR BLD AUTO: 5 %
ERYTHROCYTE [DISTWIDTH] IN BLOOD BY AUTOMATED COUNT: 12.6 % (ref 11–15)
FASTING PATIENT LIPID ANSWER: YES
FASTING STATUS PATIENT QL REPORTED: YES
GLOBULIN PLAS-MCNC: 3.3 G/DL (ref 2–3.5)
GLUCOSE BLD-MCNC: 93 MG/DL (ref 70–99)
HCT VFR BLD AUTO: 41.7 %
HDLC SERPL-MCNC: 74 MG/DL (ref 40–59)
HGB BLD-MCNC: 13.2 G/DL
IMM GRANULOCYTES # BLD AUTO: 0.02 X10(3) UL (ref 0–1)
IMM GRANULOCYTES NFR BLD: 0.3 %
LDLC SERPL CALC-MCNC: 160 MG/DL (ref ?–100)
LYMPHOCYTES # BLD AUTO: 1.56 X10(3) UL (ref 1–4)
LYMPHOCYTES NFR BLD AUTO: 22.2 %
MCH RBC QN AUTO: 30.8 PG (ref 26–34)
MCHC RBC AUTO-ENTMCNC: 31.7 G/DL (ref 31–37)
MCV RBC AUTO: 97.2 FL
MONOCYTES # BLD AUTO: 0.63 X10(3) UL (ref 0.1–1)
MONOCYTES NFR BLD AUTO: 8.9 %
NEUTROPHILS # BLD AUTO: 4.43 X10 (3) UL (ref 1.5–7.7)
NEUTROPHILS # BLD AUTO: 4.43 X10(3) UL (ref 1.5–7.7)
NEUTROPHILS NFR BLD AUTO: 62.9 %
NONHDLC SERPL-MCNC: 182 MG/DL (ref ?–130)
OSMOLALITY SERPL CALC.SUM OF ELEC: 281 MOSM/KG (ref 275–295)
PLATELET # BLD AUTO: 228 10(3)UL (ref 150–450)
POTASSIUM SERPL-SCNC: 4.6 MMOL/L (ref 3.5–5.1)
PROT SERPL-MCNC: 8 G/DL (ref 5.7–8.2)
RBC # BLD AUTO: 4.29 X10(6)UL
SODIUM SERPL-SCNC: 135 MMOL/L (ref 136–145)
T4 FREE SERPL-MCNC: 1.3 NG/DL (ref 0.8–1.7)
TRIGL SERPL-MCNC: 126 MG/DL (ref 30–149)
TSI SER-ACNC: 1.24 MIU/ML (ref 0.55–4.78)
VIT D+METAB SERPL-MCNC: 86.6 NG/ML (ref 30–100)
VLDLC SERPL CALC-MCNC: 24 MG/DL (ref 0–30)
WBC # BLD AUTO: 7 X10(3) UL (ref 4–11)

## 2024-08-01 PROCEDURE — 80053 COMPREHEN METABOLIC PANEL: CPT

## 2024-08-01 PROCEDURE — 80061 LIPID PANEL: CPT

## 2024-08-01 PROCEDURE — 84443 ASSAY THYROID STIM HORMONE: CPT

## 2024-08-01 PROCEDURE — 36415 COLL VENOUS BLD VENIPUNCTURE: CPT

## 2024-08-01 PROCEDURE — 85025 COMPLETE CBC W/AUTO DIFF WBC: CPT

## 2024-08-01 PROCEDURE — 82306 VITAMIN D 25 HYDROXY: CPT

## 2024-08-01 PROCEDURE — 84439 ASSAY OF FREE THYROXINE: CPT

## 2024-08-26 ENCOUNTER — OFFICE VISIT (OUTPATIENT)
Dept: ORTHOPEDICS CLINIC | Facility: CLINIC | Age: 69
End: 2024-08-26
Payer: MEDICARE

## 2024-08-26 ENCOUNTER — HOSPITAL ENCOUNTER (OUTPATIENT)
Dept: GENERAL RADIOLOGY | Age: 69
Discharge: HOME | End: 2024-08-26
Attending: STUDENT IN AN ORGANIZED HEALTH CARE EDUCATION/TRAINING PROGRAM
Payer: MEDICARE

## 2024-08-26 DIAGNOSIS — S52.572D CLOSED INTRA-ARTICULAR DIE-PUNCH FRACTURE OF LEFT RADIUS WITH ROUTINE HEALING, SUBSEQUENT ENCOUNTER: Primary | ICD-10-CM

## 2024-08-26 DIAGNOSIS — S52.572A CLOSED INTRA-ARTICULAR DIE-PUNCH FRACTURE OF LEFT RADIUS, INITIAL ENCOUNTER: ICD-10-CM

## 2024-08-26 PROCEDURE — 73110 X-RAY EXAM OF WRIST: CPT | Performed by: STUDENT IN AN ORGANIZED HEALTH CARE EDUCATION/TRAINING PROGRAM

## 2024-08-26 PROCEDURE — 99214 OFFICE O/P EST MOD 30 MIN: CPT | Performed by: STUDENT IN AN ORGANIZED HEALTH CARE EDUCATION/TRAINING PROGRAM

## 2024-08-27 NOTE — PROGRESS NOTES
Orthopaedic Surgery Follow-up Patient Visit  _______________________________________________________________________________________________________________  _______________________________________________________________________________________________________________    DATE OF VISIT: 8/26/2024     CHIEF COMPLAINT: Follow-up left wrist injury    Chief Complaint   Patient presents with    Fracture     L wrist injury f/u- pt using wrist splint- rates pain 1-2/10 on and off- new xray taken today        HISTORY OF PRESENT ILLNESS: Darshana Rosario is a 68 year old female who presents to the clinic for follow-up for her left distal radius fracture. Since last visit, she reports that she has continued to improve.  Her pain is currently 1-2 out of 10 in severity.  She reports this is overall intermittent and typically exacerbated by extremes of motion in her hand.  She reports that overall occasional anti-inflammatories and rest have been successful in managing her pain symptoms.  She has been immobilized in a wrist splint.  She denies any new neurologic symptoms.    SOCIAL HISTORY  Social History     Socioeconomic History    Marital status:      Spouse name: Not on file    Number of children: Not on file    Years of education: Not on file    Highest education level: Not on file   Occupational History    Not on file   Tobacco Use    Smoking status: Never    Smokeless tobacco: Never   Substance and Sexual Activity    Alcohol use: Yes     Alcohol/week: 0.0 standard drinks of alcohol     Comment: social    Drug use: No    Sexual activity: Not on file   Other Topics Concern     Service Not Asked    Blood Transfusions Not Asked    Caffeine Concern Yes     Comment: coffee    Occupational Exposure Not Asked    Hobby Hazards Not Asked    Sleep Concern Not Asked    Stress Concern Not Asked    Weight Concern Not Asked    Special Diet Not Asked    Back Care Not Asked    Exercise Not Asked    Bike Helmet Not Asked     Seat Belt Not Asked    Self-Exams Not Asked    Grew up on a farm Not Asked    History of tanning Not Asked    Outdoor occupation Not Asked    Pt has a pacemaker No    Pt has a defibrillator No    Breast feeding Not Asked    Reaction to local anesthetic No   Social History Narrative    Not on file     Social Determinants of Health     Financial Resource Strain: Not on file   Food Insecurity: Not on file   Transportation Needs: Not on file   Physical Activity: Not on file   Stress: Not on file   Social Connections: Not on file   Housing Stability: Not on file        PAST MEDICAL HISTORY  Past Medical History:    Anxiety state    Asthma (HCC)    Disorder of thyroid    High cholesterol    Right shoulder injury    physical therapy    Thyroid disease        PAST SURGICAL HISTORY  Past Surgical History:   Procedure Laterality Date    Colonoscopy  10/06/2012    Per [facility]    Colonoscopy N/A 6/20/2018    Procedure: COLONOSCOPY;  Surgeon: Rick Gar MD;  Location: [facility] ENDOSCOPY    Colonoscopy N/A 9/27/2023    Procedure: COLONOSCOPY;  Surgeon: Rick Gar MD;  Location: [facility] ENDOSCOPY    Colonoscopy & polypectomy  10/6/2012    D & c          MEDICATIONS  Reviewed  No outpatient medications have been marked as taking for the 8/26/24 encounter (Office Visit) with Kayden Enriquez MD.        ALLERGIES  Allergies   Allergen Reactions    Codeine      Other reaction(s): nausea, sweating        FAMILY HISTORY  History reviewed. No pertinent family history.     REVIEW OF SYSTEMS  A 14 point review of systems was performed. Pertinent positives and negatives noted in the HPI.    PHYSICAL EXAM  There were no vitals taken for this visit.     Constitutional: The patient is well-developed, well-nourished, in no acute distress.  Neurological: Alert and oriented to person, place, and time.  Psychiatric: Mood and affect normal.  Head: Normocephalic and atraumatic.  Cardiovascular: regular rate by  palpation  Pulmonary/Chest: Effort normal. No respiratory distress. Breathing non-labored  Abdominal: Abdomen exhibits no distension.   Left  Wrist  Inspection/Palpation  No gross deformity  No swelling of hand and fingers  No breaks in exposed skin. Skin is euthermic  ROM  Wrist:  60 flexion - 60 Extension  Fingers: Able to make full composite fist   Motor  Fires AIN/PIN/U/M/recurrent motor branch  Sensory  SILT R/U/M distributions   Fingers warm and well perfused, <2s capillary refill in all digits       IMAGING  I independently viewed and interpreted the imaging. Radiologist interpretation is available in the imaging report.  X-ray: Plain films of the left wrist demonstrate interval healing of a distal radius fracture with no interval displacement.  There is still a small amount of articular segment fracture remaining, but there is robust healing at the metaphyseal component    XR WRIST COMPLETE (MIN 3 VIEWS), LEFT (CPT=73110)    Result Date: 8/26/2024  PROCEDURE: XR WRIST COMPLETE (MIN 3 VIEWS), LEFT (CPT=73110)  COMPARISON: [facility], XR WRIST COMPLETE (MIN 3 VIEWS), LEFT (CPT=73110), 7/27/2024, 8:08 PM.  INDICATIONS: Follow up fracture.  TECHNIQUE: 3 views were obtained.   FINDINGS:  BONES: Transversely oriented fracture across the region of the growth plate in the distal metadiaphysis of the left radius.  No articular involvement.  The fracture line is more distinct with increased sclerosis compared to previous exam compatible with ongoing favorable pattern of bone healing.  No significant ulnar variance.  Surrounding osseous structures and joint spaces are anatomic. SOFT TISSUES: Mild soft tissue inflammation around the wrist. EFFUSION: None visible. OTHER: Negative.         CONCLUSION: Transverse fracture in the distal left radius with ongoing favorable pattern of bone healing.    Dictated by (CST): Brenden Mohr MD on 8/26/2024 at 2:53 PM     Finalized by (CST): Brenden Mohr MD on 8/26/2024  at 2:54 PM            ASSESSMENT/PLAN: Darshana Rosario is a 68 year old female who presents to the Orthopaedic surgery clinic today for follow-up.  She is overall healing well from her distal radius fracture.  While she is not completely healed, she is sufficiently stable to prevent displacement or new injury with removal of her wrist splint.  She may now wean out of this as tolerated and progress her motion.  She should avoid impact activities and weightbearing activities on the left upper extremity for the next month and then progress back into this as tolerated.  In 1 month's time, she should follow-up if pain or motion are substantially limiting.  Otherwise, she will progress with occupational therapy to improve her wrist and hand function/motion    We discussed the diagnosis, changes, and therapies performed to date including possible treatments and their associated risks/benefits.  WEIGHT BEARING STATUS: Weightbearing as tolerated , avoiding impact activities for the next month  RANGE-OF-MOTION LIMITATIONS: as tolerated  NEW PRESCRIPTIONS:  We discussed medications for this condition including patient current regimen. Based on this discussion we have added/re-ordered no additional medications  IMAGING ORDERED: none  CONSULTS PLACED: We discussed the role of therapy and/or additional specialty evaluation/intervention for this condition including previous/ongoing therapy and specialist services. Based on this discussion we have consulted occupational therapy  PROSTHESES/ORTHOTICS: the patient does not need prostheses/orthoses for the current condition  PROCEDURES: none    FOLLOW-UP:  4 weeks if needed    RADIOGRAPHS AT NEXT VISIT:  3 view left wrist if returning    I have personally seen Darshana Rosario and discussed in detail their plan of care. Prior to departure, they indicated agreement with and understanding of their plan of care and their follow-up as documented herein this note. Please note that  this note was written in combination with voice recognition/dictation software and there is a possibility of transcription errors which were not identified at the time of note submission. If clarification is necessary, please contact the author or clinic staff.    Kayden Enriquez MD  Orthopaedic Surgery  8/26/2024

## 2024-09-17 ENCOUNTER — OFFICE VISIT (OUTPATIENT)
Dept: OCCUPATIONAL MEDICINE | Facility: HOSPITAL | Age: 69
End: 2024-09-17
Attending: STUDENT IN AN ORGANIZED HEALTH CARE EDUCATION/TRAINING PROGRAM
Payer: MEDICARE

## 2024-09-17 DIAGNOSIS — S52.572D CLOSED INTRA-ARTICULAR DIE-PUNCH FRACTURE OF LEFT RADIUS WITH ROUTINE HEALING, SUBSEQUENT ENCOUNTER: Primary | ICD-10-CM

## 2024-09-17 PROCEDURE — 97165 OT EVAL LOW COMPLEX 30 MIN: CPT

## 2024-09-17 PROCEDURE — 97110 THERAPEUTIC EXERCISES: CPT

## 2024-09-17 NOTE — PROGRESS NOTES
OCCUPATIONAL THERAPY UPPER EXTREMITY EVALUATION     Diagnosis:   Closed intra-articular die-punch fracture of left radius with routine healing, subsequent encounter (V68.539S)       Referring Provider: Kayden Enriquez  Date of Evaluation:    9/17/2024    Precautions:  None Next MD visit:   none scheduled  Date of Fracture: 07/27/24     PATIENT SUMMARY   Darshana Rosario is a 69 year old female who presents to therapy today with complaints of Left wrist stiffness and weakness.  Hx of condition: Pt reports dancing at a party wearing non-supportive shoes. Pt lost her balance by the shoe getting caught on something resulting in FOOSH. Pt reports being very active. She plays pickle ball and tennis often.   Pt describes pain level current 0/10, at best 0/10, at worst 2/10.   Current functional limitations include pain with gardening, weight bearing, decreased dexterity for small item manipulating, and difficulty lifting anything over 5#.    Darshana describes prior level of function independent and active.   Employment: Retired  Hand Dominance: right  Living Situation: w/ spouse  Imaging/Tests: see chart  Pt goals include return to PLOF.  Past medical history was reviewed with Darshana. Significant findings include  has a past medical history of Anxiety state, Asthma (HCC), Disorder of thyroid, High cholesterol, Right shoulder injury (2000), and Thyroid disease.     ASSESSMENT  Darshana presents to occupational therapy evaluation with primary c/o Left wrist stiffness resulting in weakness 2/2 fracture. The results of the objective tests and measures show deficits in left wrist AROM, strength, and endurance impacting occupations required for daily living.  Functional deficits include but are not limited to pain with gardening, weight bearing, decreased dexterity for small item manipulating, and difficulty lifting anything over 5#.  Signs and symptoms are consistent with diagnosis of Closed intra-articular die-punch  fracture of left radius with routine healing. Pt and OT discussed evaluation findings, pathology, POC and HEP.  Pt voiced understanding and performs HEP correctly without reported pain. Skilled Occupational Therapy is medically necessary to address the above impairments and reach functional goals.     OBJECTIVE    OBSERVATION: Unremarkable     ORTHOTICS: wrist cock-up orthosis    SCAR: none    SENSORY: WNL      CIRCUMFERENTIAL EDEMA (cm):  Right Wrist crease: 14.8 cm  Left Wrist crease: 15.0    ROM: (* denotes performed with pain)  Shoulder  Elbow Wrist   WNL WNL Flexion: R 65, L 60  Extension: R 70, L 55  Ulnar Deviation: R 25, L 25  Radial Deviation R 15, L 20     Coordination:   9 Hole Peg Test:           Right Hand: 20 seconds          Left Hand: 21 seconds       Strength (lbs) Right Average Left Average   : 40 22   2 pt Pinch: 12 5   3 pt Pinch: 14 9   Lateral Pinch: 14 10     NECK SCREEN: WNL      Today’s Treatment and Response:   Pt education was provided on exam findings, treatment diagnosis, treatment plan, expectations, and prognosis.  Patient was instructed in and issued a HEP for: forearm flexor and extensor stretch, yellow light band wrist flex/extension.  Treatment included manual massage to forearm with PROM of wrist    Charges: OT Eval: Low Complexity, TE 2      Total Timed Treatment: 25 min     Total Treatment Time: 45 min     Based on clinical rationale and outcome measures, this evaluation involved Low Complexity decision making due to 1-2 personal factors/comorbidities, 3 body structures involved/activity limitations, and evolving symptoms including  decline in functional independence .  PLAN OF CARE   Goals: (to be met in 5 visits)  Pt will be independent and compliant with comprehensive HEP to maintain progress achieved in OT  Pt will increase their (L)  strength by 8-10# for ease of carrying groceries  Pt will increase their (L) pinch strength scores by 3-5# for ease of opening bottles  and jars.      Frequency / Duration: Patient will be seen for 1-2x/week or a total of 5 visits over a 90 day period.  Treatment will include: Manual Therapy, Soft tissue mobilization, AROM, PROM, Strengthening, Therapeutic Activity, Moist heat, cryotherapy, Ultrasound, Whirlpool (fluidotherapy), Paraffin, Electrical Stim, Orthosis,  Neuromuscular Re-education, Patient education, Home exercise program,        Education or treatment limitation: None  Rehab Potential:excellent    QuickDASH Outcome Score  Score: 18.18 % (9/15/2024  9:18 AM)      Patient/Family/Caregiver was advised of these findings, precautions, and treatment options and has agreed to actively participate in planning and for this course of care.    Thank you for your referral. Please co-sign or sign and return this letter via fax as soon as possible to [phone]. If you have any questions, please contact me at Dept: [phone]    Sincerely,  Electronically signed by therapist: Esther Louis OT  Physician's certification required: Yes  I certify the need for these services furnished under this plan of treatment and while under my care.    X___________________________________________________ Date____________________    Certification From: 9/17/2024  To:12/16/2024

## 2024-09-19 ENCOUNTER — OFFICE VISIT (OUTPATIENT)
Dept: OCCUPATIONAL MEDICINE | Facility: HOSPITAL | Age: 69
End: 2024-09-19
Attending: STUDENT IN AN ORGANIZED HEALTH CARE EDUCATION/TRAINING PROGRAM
Payer: MEDICARE

## 2024-09-19 PROCEDURE — 97110 THERAPEUTIC EXERCISES: CPT

## 2024-09-19 PROCEDURE — 97140 MANUAL THERAPY 1/> REGIONS: CPT

## 2024-09-19 NOTE — PROGRESS NOTES
Diagnosis:   Closed intra-articular die-punch fracture of left radius with routine healing, subsequent encounter (E18.589C)        Referring Provider: Kayden Enriquez  Date of Evaluation:    9/17/2024    Precautions:  None Next MD visit:   none scheduled  Date of Fracture: 07/27/24   Insurance Primary/Secondary: BCBS OUT OF STATE / N/A     # Auth Visits: no auth,             Subjective: Pt states compliance with her HEP and min to no pain since last session.    Pain: 0/10      Objective: See exercises flowsheet below for exercises and activities performed today. All exercises performed bilaterally.      Assessment: Pt demo'd good comprehension of exercise instructions as evidenced by min v/c. Wrist flexion resulted in more discomfort than extension, however, manageable per pt.       Goals: (to be met in 5 visits)  Pt will be independent and compliant with comprehensive HEP to maintain progress achieved in OT  Pt will increase their (L)  strength by 8-10# for ease of carrying groceries  Pt will increase their (L) pinch strength scores by 3-5# for ease of opening bottles and jars.    Plan: Continue with increasing L wrist strength   Date 9/19/2024                 Visit # 2/5                                    Evaluation                 Manual                                STM to forearm extensor    PROM in wrist extension/flexion                  Ther ex                                  Forearm flexor/extensor stretch 3x1, 20 seconds     Red web ex: 10x2  - and twist  -lumbrical squeeze    2# wrist PRE  -flex/extend in pronation then supination 10x2    Red 10# flexbar bilateral/ipsilateral sup/pronation 10x2    Red 10# flexbar \"t\" bends                   HEP instruction                    Therapeutic Activity                                                 Neuromuscular Re-education                               Neuro re-ed Frisbee tennis ball rotation (3x for 30 seconds)                 Modalities                                                 HEP:  Assignment date:   forearm flexor and extensor stretch, yellow light band wrist flex/extension 09/17/24              Charges: MT 1 (8), TE 2 (30)       Total Timed Treatment: 38 min  Total Treatment Time: 38 min

## 2024-09-25 ENCOUNTER — OFFICE VISIT (OUTPATIENT)
Dept: OCCUPATIONAL MEDICINE | Facility: HOSPITAL | Age: 69
End: 2024-09-25
Attending: STUDENT IN AN ORGANIZED HEALTH CARE EDUCATION/TRAINING PROGRAM
Payer: MEDICARE

## 2024-09-25 PROCEDURE — 97110 THERAPEUTIC EXERCISES: CPT

## 2024-09-25 PROCEDURE — 97140 MANUAL THERAPY 1/> REGIONS: CPT

## 2024-09-25 NOTE — PROGRESS NOTES
Diagnosis:   Closed intra-articular die-punch fracture of left radius with routine healing, subsequent encounter (J60.773Y)        Referring Provider: Kayden Enriquez  Date of Evaluation:    9/17/2024    Precautions:  None Next MD visit:   none scheduled  Date of Fracture: 07/27/24   Insurance Primary/Secondary: BCBS OUT OF STATE / N/A     # Auth Visits: no auth,             Subjective: Pt states playing [employer] ball without pain. Pt states her  is a physician and has done a few lazer treatments.    Pain: 0/10      Objective: See exercises flowsheet below for exercises and activities performed today. All exercises performed bilaterally.      Assessment: Fluidotherapy initiated for tissue lengthening through heat circulation while performing AROM. Pt consented to treatment and no irritation noted post usage. All exercises graded up to increase activity tolerance through higher resistant equipment.       Goals: (to be met in 5 visits)  Pt will be independent and compliant with comprehensive HEP to maintain progress achieved in OT  Pt will increase their (L)  strength by 8-10# for ease of carrying groceries  Pt will increase their (L) pinch strength scores by 3-5# for ease of opening bottles and jars.    Plan: Continue with increasing L wrist strength, possibly discharge next session pending goal attainment  Date 9/19/2024 9/25/2024                Visit # 2/5  3/5                                  Evaluation                 Manual                                STM to forearm extensor    PROM in wrist extension/flexion  STM to forearm extensor    PROM in wrist extension/flexion                Ther ex                                  Forearm flexor/extensor stretch 3x1, 20 seconds     Red web ex: 10x2  - and twist  -lumbrical squeeze    2# wrist PRE  -flex/extend in pronation then supination 10x2    Red 10# flexbar bilateral/ipsilateral sup/pronation 10x2    Red 10# flexbar \"t\" bends   Fluidotherapy with  wrist AROM 7 mins    Forearm flexor/extensor stretch 3x1, 20 seconds    Blue web ex: 10x2  - and twist  -lumbrical squeeze    Green Elastic band ex: 10x2  -flex/extend  -RD/UD    Green 15# flexbar bilateral sup/pronation 10x2    Red flexbar 10# ipsilateral sup/pronation 10x2               HEP instruction                    Therapeutic Activity                                                 Neuromuscular Re-education                               Neuro re-ed Frisbee tennis ball rotation (3x for 30 seconds)                 Modalities                                                HEP:  Assignment date:   forearm flexor and extensor stretch, yellow light band wrist flex/extension 09/17/24   Green band wrist flex/extension 9/25/2024           Charges: MT 1 (8), TE 2 (32)       Total Timed Treatment: 40 min  Total Treatment Time: 40 min

## 2024-10-01 ENCOUNTER — TELEPHONE (OUTPATIENT)
Dept: PHYSICAL THERAPY | Facility: HOSPITAL | Age: 69
End: 2024-10-01

## 2024-10-01 ENCOUNTER — OFFICE VISIT (OUTPATIENT)
Dept: OCCUPATIONAL MEDICINE | Facility: HOSPITAL | Age: 69
End: 2024-10-01
Attending: STUDENT IN AN ORGANIZED HEALTH CARE EDUCATION/TRAINING PROGRAM
Payer: MEDICARE

## 2024-10-01 PROCEDURE — 97140 MANUAL THERAPY 1/> REGIONS: CPT

## 2024-10-01 PROCEDURE — 97110 THERAPEUTIC EXERCISES: CPT

## 2024-10-01 NOTE — PROGRESS NOTES
Diagnosis:   Closed intra-articular die-punch fracture of left radius with routine healing, subsequent encounter (C03.742F)        Referring Provider: Kayden Enriquez  Date of Evaluation:    9/17/2024    Precautions:  None Next MD visit:   none scheduled  Date of Fracture: 07/27/24   Insurance Primary/Secondary: BCBS OUT OF STATE / N/A     # Auth Visits: no auth,             Subjective: Pt states 1-2 pain level on pinky side. Pt wore the brace while playing tennis.     Pain: 0/10      Objective: See exercises flowsheet below for exercises and activities performed today. All exercises performed bilaterally.      Assessment: Pt continues to have some STR within the dorsum of the wrist. Red putty exercises initiated today. Pt able to perform all exercises with no verbalization of increased pain. Opposition to the L SF most challenging for pt today.      Goals: (to be met in 5 visits)  Pt will be independent and compliant with comprehensive HEP to maintain progress achieved in OT  Pt will increase their (L)  strength by 8-10# for ease of carrying groceries  Pt will increase their (L) pinch strength scores by 3-5# for ease of opening bottles and jars.    Plan: Discharge next session pending goal attainment  Date 9/19/2024  9/25/2024   10/1/2024              Visit # 2/5  3/5  4/5                                Evaluation                 Manual                                STM to forearm extensor    PROM in wrist extension/flexion  STM to forearm extensor    PROM in wrist extension/flexion  STM to forearm extensor    PROM in wrist extension/flexion              Ther ex                                  Forearm flexor/extensor stretch 3x1, 20 seconds     Red web ex: 10x2  - and twist  -lumbrical squeeze    2# wrist PRE  -flex/extend in pronation then supination 10x2    Red 10# flexbar bilateral/ipsilateral sup/pronation 10x2    Red 10# flexbar \"t\" bends   Fluidotherapy with wrist AROM 7 mins    Forearm  flexor/extensor stretch 3x1, 20 seconds    Blue web ex: 10x2  - and twist  -lumbrical squeeze    Green Elastic band ex: 10x2  -flex/extend  -RD/UD    Green 15# flexbar bilateral sup/pronation 10x2    Red flexbar 10# ipsilateral sup/pronation 10x2 Forearm flexor/extensor stretch 3x1, 20 seconds    Red Putty Ex: 10x2  -  -lumbrical squeezes  -digital adduction  -digital opposition  -lateral pinch             HEP instruction                    Therapeutic Activity                                                 Neuromuscular Re-education                               Neuro re-ed Frisbee tennis ball rotation (3x for 30 seconds)                 Modalities                                                HEP:  Assignment date:   forearm flexor and extensor stretch, yellow light band wrist flex/extension 09/17/24   Green band wrist flex/extension 9/25/2024           Charges: MT 1 (8), TE 2 (32)       Total Timed Treatment: 40 min  Total Treatment Time: 40 min

## 2024-10-02 NOTE — DISCHARGE INSTRUCTIONS
Home Care Instructions for Colonoscopy  with Sedation    Diet:  - Resume your regular diet as tolerated unless otherwise instructed. - Start with light meals to minimize bloating.  - Do not drink alcohol today. Medication:  - If you have questions about resuming your normal medications, please contact your Primary Care Physician. Activities:  - Take it easy today. Do not return to work today. - Do not drive today. - Do not operate any machinery today (including kitchen equipment). Colonoscopy:  - You may notice some rectal \"spotting\" (a little blood on the toilet tissue) for a day or two after the exam. This is normal.  - If you experience any rectal bleeding (not spotting), persistent tenderness or sharp severe abdominal pains, oral temperature over 100 degrees Fahrenheit, light-headedness or dizziness, or any other problems, contact your doctor. **If unable to reach your doctor, please go to the [facility] Emergency Room**    - Your referring physician will receive a full report of your examination.  - If you do not hear from your doctor's office within two weeks of your biopsy, please call them for your results. You may be able to see your laboratory results in [facility] between 4 and 7 business days. In some cases, your physician may not have viewed the results before they are released to [employer]. If you have questions regarding your results contact the physician who ordered the test/exam by phone or via [employer] by choosing \"Ask a Medical Question. \" Please advise on what procedure this patient is wanting and we can schedule it

## 2024-10-07 ENCOUNTER — APPOINTMENT (OUTPATIENT)
Dept: OCCUPATIONAL MEDICINE | Facility: HOSPITAL | Age: 69
End: 2024-10-07
Attending: STUDENT IN AN ORGANIZED HEALTH CARE EDUCATION/TRAINING PROGRAM
Payer: MEDICARE

## 2024-10-16 ENCOUNTER — OFFICE VISIT (OUTPATIENT)
Dept: OCCUPATIONAL MEDICINE | Facility: HOSPITAL | Age: 69
End: 2024-10-16
Attending: STUDENT IN AN ORGANIZED HEALTH CARE EDUCATION/TRAINING PROGRAM
Payer: MEDICARE

## 2024-10-16 PROCEDURE — 97110 THERAPEUTIC EXERCISES: CPT

## 2024-10-16 NOTE — PROGRESS NOTES
Diagnosis:   Closed intra-articular die-punch fracture of left radius with routine healing, subsequent encounter (S38.753H)        Referring Provider: Kayden Enriquez  Date of Evaluation:    9/17/2024    Precautions:  None Next MD visit:   none scheduled  Date of Fracture: 07/27/24   Insurance Primary/Secondary: BCBS OUT OF STATE / N/A     # Auth Visits: no auth,               Discharge Summary  Pt has attended 5, cancelled 1, and no shown 0 visits in Occupational Therapy.     Subjective: Pt states she has return to her normal level of independence. Pt played tennis and pickle-ball yesterday with no difficulty.       Assessment: Pt has return to PLOF. Progress measurements taken, see below. Pt met all 3 goals. Pt is safe to discharge from OT services.       Objective Data:     Strength (lbs) Left Average Eval Left Average 10/16/2024    : 22 35 (+13)   2 pt Pinch: 5 8 (+3)   3 pt Pinch: 9 13 (+4)   Lateral Pinch: 10 12 (+2)       Goals:   Pt will be independent and compliant with comprehensive HEP to maintain progress achieved in OT-MET  Pt will increase their (L)  strength by 8-10# for ease of carrying groceries-MET  Pt will increase their (L) pinch strength scores by 3-5# for ease of opening bottles and jars.-MET within parameters      Plan: Discharge patient for independent completion of HEP    Patient/Family/Caregiver was advised of these findings, precautions, and treatment options and has agreed to actively participate in planning and for this course of care.    Thank you for your referral. If you have any questions, please contact me at Dept: [phone].    Sincerely,  Electronically signed by therapist: Esther Louis, OT    Physician's certification required: No  Please co-sign or sign and return this letter via fax as soon as possible to [phone].   I certify the need for these services furnished under this plan of treatment and while under my  care.    X___________________________________________________ Date____________________    Certification From: 10/16/2024  To:1/14/2025              Treatment Objective: See exercises flowsheet below for exercises and activities performed today. All exercises performed bilaterally.    Date 9/19/2024  9/25/2024   10/1/2024   10/16/2024            Visit # 2/5  3/5 4/5 5/5                              Evaluation                 Manual                                STM to forearm extensor    PROM in wrist extension/flexion  STM to forearm extensor    PROM in wrist extension/flexion  STM to forearm extensor    PROM in wrist extension/flexion  STM to forearm extensor    PROM in wrist extension/flexion            Ther ex                                  Forearm flexor/extensor stretch 3x1, 20 seconds     Red web ex: 10x2  - and twist  -lumbrical squeeze    2# wrist PRE  -flex/extend in pronation then supination 10x2    Red 10# flexbar bilateral/ipsilateral sup/pronation 10x2    Red 10# flexbar \"t\" bends   Fluidotherapy with wrist AROM 7 mins    Forearm flexor/extensor stretch 3x1, 20 seconds    Blue web ex: 10x2  - and twist  -lumbrical squeeze    Green Elastic band ex: 10x2  -flex/extend  -RD/UD    Green 15# flexbar bilateral sup/pronation 10x2    Red flexbar 10# ipsilateral sup/pronation 10x2 Forearm flexor/extensor stretch 3x1, 20 seconds    Red Putty Ex: 10x2  -  -lumbrical squeezes  -digital adduction  -digital opposition  -lateral pinch Wrist AROM 10x2    Forearm flexor/extensor stretch 3x1, 30 seconds    Green Elastic band ex: 10x2  -flex/extend  -RD/UD           HEP instruction                    Therapeutic Activity                                                 Neuromuscular Re-education                               Neuro re-ed Frisbee tennis ball rotation (3x for 30 seconds)                 Modalities                                               HEP:  Assignment date:   forearm flexor and  extensor stretch, yellow light band wrist flex/extension 09/17/24   Green band wrist flex/extension 9/25/2024           Charges: TE 3  Total Timed Treatment: 38 min  Total Treatment Time: 40 min

## 2025-08-05 ENCOUNTER — LAB ENCOUNTER (OUTPATIENT)
Dept: LAB | Facility: HOSPITAL | Age: 70
End: 2025-08-05
Attending: INTERNAL MEDICINE

## 2025-08-05 DIAGNOSIS — I51.9 MYXEDEMA HEART DISEASE: ICD-10-CM

## 2025-08-05 DIAGNOSIS — D50.8 IRON DEFICIENCY ANEMIA SECONDARY TO INADEQUATE DIETARY IRON INTAKE: ICD-10-CM

## 2025-08-05 DIAGNOSIS — E55.9 AVITAMINOSIS D: Primary | ICD-10-CM

## 2025-08-05 DIAGNOSIS — E03.9 MYXEDEMA HEART DISEASE: ICD-10-CM

## 2025-08-05 LAB
ALBUMIN SERPL-MCNC: 4.6 G/DL (ref 3.2–4.8)
ALBUMIN/GLOB SERPL: 1.6 (ref 1–2)
ALP LIVER SERPL-CCNC: 89 U/L (ref 55–142)
ALT SERPL-CCNC: 11 U/L (ref 10–49)
ANION GAP SERPL CALC-SCNC: 8 MMOL/L (ref 0–18)
AST SERPL-CCNC: 17 U/L (ref ?–34)
BASOPHILS # BLD AUTO: 0.05 X10(3) UL (ref 0–0.2)
BASOPHILS NFR BLD AUTO: 0.9 %
BILIRUB SERPL-MCNC: 0.6 MG/DL (ref 0.2–1.1)
BUN BLD-MCNC: 18 MG/DL (ref 9–23)
BUN/CREAT SERPL: 17.5 (ref 10–20)
CALCIUM BLD-MCNC: 9.5 MG/DL (ref 8.7–10.4)
CHLORIDE SERPL-SCNC: 101 MMOL/L (ref 98–112)
CHOLEST SERPL-MCNC: 235 MG/DL (ref ?–200)
CO2 SERPL-SCNC: 27 MMOL/L (ref 21–32)
CREAT BLD-MCNC: 1.03 MG/DL (ref 0.55–1.02)
DEPRECATED RDW RBC AUTO: 43.2 FL (ref 35.1–46.3)
EGFRCR SERPLBLD CKD-EPI 2021: 58 ML/MIN/1.73M2 (ref 60–?)
EOSINOPHIL # BLD AUTO: 0.41 X10(3) UL (ref 0–0.7)
EOSINOPHIL NFR BLD AUTO: 7.8 %
ERYTHROCYTE [DISTWIDTH] IN BLOOD BY AUTOMATED COUNT: 12.4 % (ref 11–15)
FASTING PATIENT LIPID ANSWER: YES
FASTING STATUS PATIENT QL REPORTED: YES
GLOBULIN PLAS-MCNC: 2.9 G/DL (ref 2–3.5)
GLUCOSE BLD-MCNC: 93 MG/DL (ref 70–99)
HCT VFR BLD AUTO: 37.8 % (ref 35–48)
HDLC SERPL-MCNC: 85 MG/DL (ref 40–59)
HGB BLD-MCNC: 12.3 G/DL (ref 12–16)
IMM GRANULOCYTES # BLD AUTO: 0 X10(3) UL (ref 0–1)
IMM GRANULOCYTES NFR BLD: 0 %
LDLC SERPL CALC-MCNC: 135 MG/DL (ref ?–100)
LYMPHOCYTES # BLD AUTO: 1.69 X10(3) UL (ref 1–4)
LYMPHOCYTES NFR BLD AUTO: 31.9 %
MCH RBC QN AUTO: 30.8 PG (ref 26–34)
MCHC RBC AUTO-ENTMCNC: 32.5 G/DL (ref 31–37)
MCV RBC AUTO: 94.7 FL (ref 80–100)
MONOCYTES # BLD AUTO: 0.68 X10(3) UL (ref 0.1–1)
MONOCYTES NFR BLD AUTO: 12.9 %
NEUTROPHILS # BLD AUTO: 2.46 X10 (3) UL (ref 1.5–7.7)
NEUTROPHILS # BLD AUTO: 2.46 X10(3) UL (ref 1.5–7.7)
NEUTROPHILS NFR BLD AUTO: 46.5 %
NONHDLC SERPL-MCNC: 150 MG/DL (ref ?–130)
OSMOLALITY SERPL CALC.SUM OF ELEC: 284 MOSM/KG (ref 275–295)
PLATELET # BLD AUTO: 222 10(3)UL (ref 150–450)
POTASSIUM SERPL-SCNC: 4.6 MMOL/L (ref 3.5–5.1)
PROT SERPL-MCNC: 7.5 G/DL (ref 5.7–8.2)
RBC # BLD AUTO: 3.99 X10(6)UL (ref 3.8–5.3)
SODIUM SERPL-SCNC: 136 MMOL/L (ref 136–145)
T3FREE SERPL-MCNC: 2.82 PG/ML (ref 2.4–4.2)
TRIGL SERPL-MCNC: 89 MG/DL (ref 30–149)
TSI SER-ACNC: 0.52 UIU/ML (ref 0.55–4.78)
VIT D+METAB SERPL-MCNC: 61.4 NG/ML (ref 30–100)
VLDLC SERPL CALC-MCNC: 16 MG/DL (ref 0–30)
WBC # BLD AUTO: 5.3 X10(3) UL (ref 4–11)

## 2025-08-05 PROCEDURE — 80061 LIPID PANEL: CPT

## 2025-08-05 PROCEDURE — 36415 COLL VENOUS BLD VENIPUNCTURE: CPT

## 2025-08-05 PROCEDURE — 82306 VITAMIN D 25 HYDROXY: CPT

## 2025-08-05 PROCEDURE — 85025 COMPLETE CBC W/AUTO DIFF WBC: CPT

## 2025-08-05 PROCEDURE — 80053 COMPREHEN METABOLIC PANEL: CPT

## 2025-08-05 PROCEDURE — 84481 FREE ASSAY (FT-3): CPT

## 2025-08-05 PROCEDURE — 84443 ASSAY THYROID STIM HORMONE: CPT

## (undated) DIAGNOSIS — R19.7 DIARRHEA, UNSPECIFIED TYPE: Primary | ICD-10-CM

## (undated) DIAGNOSIS — Z20.822 SUSPECTED 2019 NOVEL CORONAVIRUS INFECTION: Primary | ICD-10-CM

## (undated) DEVICE — SNARE CAPTIFLEX MICRO-OVL OLY

## (undated) DEVICE — ENDOSCOPY PACK - LOWER: Brand: MEDLINE INDUSTRIES, INC.

## (undated) DEVICE — 60 ML SYRINGE REGULAR TIP: Brand: MONOJECT

## (undated) DEVICE — TRAP 4 CPTR CHMBR N EZ INLN

## (undated) DEVICE — Device: Brand: DUAL NARE NASAL CANNULAE FEMALE LUER CON 7FT O2 TUBE

## (undated) DEVICE — KIT ENDO ORCAPOD 160/180/190

## (undated) DEVICE — MEDI-VAC NON-CONDUCTIVE SUCTION TUBING 6MM X 1.8M (6FT.) L: Brand: CARDINAL HEALTH

## (undated) DEVICE — KIT CLEAN ENDOKIT 1.1OZ GOWNX2

## (undated) DEVICE — Device: Brand: DEFENDO AIR/WATER/SUCTION AND BIOPSY VALVE

## (undated) DEVICE — SNARE OPTMZ PLPCTM TRP

## (undated) NOTE — ED AVS SNAPSHOT
[facility] AND [facility] in [facility] [address].  [address] [facility]    Phone:  [phone]    Fax:  [phone]           Carlos Torres   MRN: [id number]    Department:  [facility] AND [facility] in [address] [place] [address]   Date of Visit: RED EYE: CAUSES, UNDERSTANDING (ENGLISH)    VIRAL SYNDROME (ADULT) (ENGLISH)      Disclosure     Insurance plans vary and the physician(s) referred by the Immediate Care may not be covered by your plan.   It is possible that the physician may not participa IF THERE IS ANY CHANGE OR WORSENING OF YOUR CONDITION, CALL YOUR PRIMARY CARE PHYSICIAN AT ONCE OR GO TO THE EMERGENCY DEPARTMENT.     If you have been prescribed any medication(s), please fill your prescription right away and begin taking the medication(s) harming yourself, contact [facility] at [phone]. - If you don’t have insurance, [employer] has partnered with [facility] [address] to help you get signed up for insurance coverage.   [facility]

## (undated) NOTE — LETTER
[address]  Authorization for Surgical Operation and Procedure                                                                                           I hereby authorize Rica Ibarra MD, my physician and his/her assistants (if applicable), which may include medical students, residents, and/or fellows, to perform the following surgical operation/ procedure and administer such anesthesia as may be determined necessary by my physician: Operation/Procedure name (s) COLONOSCOPY on Kesha Mcmillan   2. I recognize that during the surgical operation/procedure, unforeseen conditions may necessitate additional or different procedures than those listed above. I, therefore, further authorize and request that the above-named surgeon, assistants, or designees perform such procedures as are, in their judgment, necessary and desirable. 3.   My surgeon/physician has discussed prior to my surgery the potential benefits, risks and side effects of this procedure; the likelihood of achieving goals; and potential problems that might occur during recuperation. They also discussed reasonable alternatives to the procedure, including risks, benefits, and side effects related to the alternatives and risks related to not receiving this procedure. I have had all my questions answered and I acknowledge that no guarantee has been made as to the result that may be obtained. 4.   Should the need arise during my operation/procedure, which includes change of level of care prior to discharge, I also consent to the administration of blood and/or blood products. Further, I understand that despite careful testing and screening of blood or blood products by collecting agencies, I may still be subject to ill effects as a result of receiving a blood transfusion and/or blood products.   The following are some, but not all, of the potential risks that can occur: fever and allergic reactions, hemolytic reactions, transmission of diseases such as Hepatitis, AIDS and Cytomegalovirus (CMV) and fluid overload. In the event that I wish to have an autologous transfusion of my own blood, or a directed donor transfusion, I will discuss this with my physician. Check only if Refusing Blood or Blood Products  I understand refusal of blood or blood products as deemed necessary by my physician may have serious consequences to my condition to include possible death. I hereby assume responsibility for my refusal and release the hospital, its personnel, and my physicians from any responsibility for the consequences of my refusal.    o  Refuse   5. I authorize the use of any specimen, organs, tissues, body parts or foreign objects that may be removed from my body during the operation/procedure for diagnosis, research or teaching purposes and their subsequent disposal by hospital authorities. I also authorize the release of specimen test results and/or written reports to my treating physician on the hospital medical staff or other referring or consulting physicians involved in my care, at the discretion of the Pathologist or my treating physician. 6.   I consent to the photographing or videotaping of the operations or procedures to be performed, including appropriate portions of my body for medical, scientific, or educational purposes, provided my identity is not revealed by the pictures or by descriptive texts accompanying them. If the procedure has been photographed/videotaped, the surgeon will obtain the original picture, image, videotape or CD. The hospital will not be responsible for storage, release or maintenance of the picture, image, tape or CD.    7.   I consent to the presence of a  or observers in the operating room as deemed necessary by my physician or their designees.     8.   I recognize that in the event my procedure results in extended X-Ray/fluoroscopy time, I may develop a skin reaction. 9. If I have a Do Not Attempt Resuscitation (DNAR) order in place, that status will be suspended while in the operating room, procedural suite, and during the recovery period unless otherwise explicitly stated by me (or a person authorized to consent on my behalf). The surgeon or my attending physician will determine when the applicable recovery period ends for purposes of reinstating the DNAR order. 10. Patients having a sterilization procedure: I understand that if the procedure is successful the results will be permanent and it will therefore be impossible for me to inseminate, conceive, or bear children. I also understand that the procedure is intended to result in sterility, although the result has not been guaranteed. 11. I acknowledge that my physician has explained sedation/analgesia administration to me including the risk and benefits I consent to the administration of sedation/analgesia as may be necessary or desirable in the judgment of my physician. I CERTIFY THAT I HAVE READ AND FULLY UNDERSTAND THE ABOVE CONSENT TO OPERATION and/or OTHER PROCEDURE.     _________________________________________ _________________________________     ___________________________________  Signature of Patient     Signature of Responsible Person                   Printed Name of Responsible Person                              _________________________________________ ______________________________        ___________________________________  Signature of Witness         Date  Time         Relationship to Patient    STATEMENT OF PHYSICIAN My signature below affirms that prior to the time of the procedure; I have explained to the patient and/or his/her legal representative, the risks and benefits involved in the proposed treatment and any reasonable alternative to the proposed treatment.  I have also explained the risks and benefits involved in refusal of the proposed treatment and alternatives to the proposed treatment and have answered the patient's questions.  If I have a significant financial interest in a co-management agreement or a significant financial interest in any product or implant, or other significant relationship used in this procedure/surgery, I have disclosed this and had a discussion with my patient.     _______________________________________________________________ _____________________________  Cynthia Mckenna of Physician)                                                                                         (Date)                                   (Time)  Patient Name: Alessandra Cedeño    : 1955   Printed: 2023      Medical Record #: [id number]                                              Page 1 of 1

## (undated) NOTE — MR AVS SNAPSHOT
[facility] SPECIALTY [facility] - [place] [address]  [id number] [phone]               Thank you for choosing us for your health care visit with Sherif Fagan MD.  We are glad to serve you and happy to provide you with this summary of y Other Machine will allow you to access patient instructions from your recent visit,  view other health information, and more. To sign up or find more information, go to [email or web address] and click on the Sign Up Now link in the [employer] box.      Enter

## (undated) NOTE — ED AVS SNAPSHOT
Africa Jain   MRN: [id number]    Department:  [facility]   Date of Visit:  11/13/2019           Disclosure     Insurance plans vary and the physician(s) referred by the ER may not be covered by your plan.  Please co CARE PHYSICIAN AT ONCE OR RETURN IMMEDIATELY TO THE EMERGENCY DEPARTMENT. If you have been prescribed any medication(s), please fill your prescription right away and begin taking the medication(s) as directed.   If you believe that any of the medications

## (undated) NOTE — LETTER
4/20/2023    [address] [place] [address]        [address]            Dear [address] [place] [address],      Our records indicate that you are due for an appointment for a Colonoscopy with Joey Fuller MD. Our doctors are booking out about 3-5 months in advance for procedures. Please call our office to schedule a phone screening appointment to plan for the procedure(s). Your medical well-being is important to us. If your insurance requires a referral, please call your primary care office to request one.       Thank you,      The Physicians and Staff at [facility]

## (undated) NOTE — LETTER
Date & Time: 4/19/2022, 5:19 PM  Patient: Juventino Cross  Encounter Provider(s):    DIANE Rene       To Whom It May Concern:    Demario Cancer was seen and treated in our department on 4/19/2022. She can return to work 4/21/2022. Negative flu, and negative COVID.     DIANE Keane, 04/19/22, 5:19 PM

## (undated) NOTE — LETTER
7/30/2024          To Whom It May Concern:    Darshana Rosario is currently under my medical care.    Please allow her to pause her membership while she heals from her broken wrist.  She will be unable to play tennis for a period of two months.    If you require additional information please contact our office.        Sincerely,    Kayden Enriquez MD